# Patient Record
Sex: FEMALE | Race: WHITE | NOT HISPANIC OR LATINO | Employment: FULL TIME | URBAN - METROPOLITAN AREA
[De-identification: names, ages, dates, MRNs, and addresses within clinical notes are randomized per-mention and may not be internally consistent; named-entity substitution may affect disease eponyms.]

---

## 2022-08-22 ENCOUNTER — VIRTUAL VISIT (OUTPATIENT)
Dept: DERMATOLOGY | Facility: CLINIC | Age: 22
End: 2022-08-22
Payer: COMMERCIAL

## 2022-08-22 DIAGNOSIS — L70.0 ACNE VULGARIS: Primary | ICD-10-CM

## 2022-08-22 DIAGNOSIS — Z79.899 ON ISOTRETINOIN THERAPY: ICD-10-CM

## 2022-08-22 PROCEDURE — 99203 OFFICE O/P NEW LOW 30 MIN: CPT | Mod: 95 | Performed by: DERMATOLOGY

## 2022-08-22 NOTE — NURSING NOTE
Chief Complaint   Patient presents with     Derm Problem   Teledermatology Nurse Call Patients:     Are you in the [facility] at the time of the encounter? yes    Today's visit will be billed to you and your insurance.    A teledermatology visit is not as thorough as an in-person visit and the quality of the photograph sent may not be of the same quality as that taken by the dermatology clinic.    Patient summary of issue: acne  Location of problem on body: Back and face  How long has area/symptoms been present: many years  What makes it better?: nothing has really helped in the past   What makes it worse?: sweat   Other symptoms include the following: painful  Which medications have been tried, for how long, and did they make it better or worse (ex. Triamcinolone used twice daily for 2 weeks, not improved): multiple medications and creams  The patient has seen a dermatologist.   The patient has no past medical history of skin cancer  ROS: The patient is generally feeling well.    Dagmar Owen,   Virtual Visit Facilitator

## 2022-08-22 NOTE — LETTER
Date:August 24, 2022      Patient was self referred, no letter generated. Do not send.        [facility] Health Information

## 2022-08-22 NOTE — PROGRESS NOTES
[facility] Dermatology Note  Encounter Date: Aug 22, 2022  Store-and-Forward and Telephone ([phone]). Location of teledermatologist: [facility] DERMATOLOGY CLINIC [place].  Start time: 9:53. End time: 10:10.    Dermatology Problem List:  1. Acne vulgaris   - goal dose: 70 kg x 220 mg/kg = 75076 mg  - cumulative dose: 0 mg  - contraception: Depo; considering switch to copper IUD; condoms  - prior: tretinoin, topical antibiotics, spironolactone    ____________________________________________    Assessment & Plan:     1. Acne vulgaris: mixed inflammatory/comedonal and refractory to topicals and spironolactone. We discussed risks/benefits of next steps in treatment including spironolactone, oral antibiotics, isotretinoin, and will pursue the last. We discussed that changing contraceptive method (from Depo to ParaGard) may cause delay in iPledge.  - come in for nurse visit for iPledge enrollment & baseline CBC, CMP, lipids, hCG  - repeat UPT in 30 days  - then start isotretinoin 40 mg daily    Procedures Performed:    None    Follow-up: 1 month    Staff:     René Desir MD, FAAD   of Dermatology  Department of Dermatology  [facility] [employer]    ____________________________________________    CC: Derm Problem    HPI:  Ms. Leelee Rousseau is a(n) 22 year old female who presents today as a new patient for acne vulgaris    Acne vulgaris - on face, seems to correspond with menstruation - fluctuates in severity  - back has been more severe since age 15-16; flares with sweating  - prior - tretinoin, other topicals, spironolactone ~25-50 mg (3 months on/off) a few years ago  - deep, large, painful lesions on the back    Patient is otherwise feeling well, without additional skin concerns.    Labs Reviewed:  N/A    Physical Exam:  Vitals: There were no vitals taken for this visit.  SKIN: Teledermatology photos were reviewed; image quality and  interpretability: acceptable. Image date: 8/22/22.  - acneiform papules on the face and back  - No other lesions of concern on areas examined.     Medications:  Current Outpatient Medications   Medication     medroxyPROGESTERone (DEPO-SUBQ PROVERA) 104 MG/0.65ML SC injection     No current facility-administered medications for this visit.      Past Medical/Surgical History:   There is no problem list on file for this patient.    No past medical history on file.    CC Referred Self, MD  No address on file on close of this encounter.

## 2022-08-22 NOTE — LETTER
8/22/2022       RE: Leelee Rousseau  [address] [facility]   [address]  [place] [address]     Dear Colleague,    Thank you for referring your patient, Leelee Rousseau, to the [facility] DERMATOLOGY CLINIC [place] at [facility]. Please see a copy of my visit note below.    [facility] Dermatology Note  Encounter Date: Aug 22, 2022  Store-and-Forward and Telephone ([phone]). Location of teledermatologist: [facility] DERMATOLOGY [facility].  Start time: 9:53. End time: 10:10.    Dermatology Problem List:  1. Acne vulgaris   - goal dose: 70 kg x 220 mg/kg = 93524 mg  - cumulative dose: 0 mg  - contraception: Depo; considering switch to copper IUD; condoms  - prior: tretinoin, topical antibiotics, spironolactone    ____________________________________________    Assessment & Plan:     1. Acne vulgaris: mixed inflammatory/comedonal and refractory to topicals and spironolactone. We discussed risks/benefits of next steps in treatment including spironolactone, oral antibiotics, isotretinoin, and will pursue the last. We discussed that changing contraceptive method (from Depo to ParaGard) may cause delay in iPledge.  - come in for nurse visit for iPledge enrollment & baseline CBC, CMP, lipids, hCG  - repeat UPT in 30 days  - then start isotretinoin 40 mg daily    Procedures Performed:    None    Follow-up: 1 month    Staff:     René Desir MD, FAAD   of Dermatology  Department of Dermatology  [facility] [employer]    ____________________________________________    CC: Derm Problem    HPI:  Ms. Leelee Rousseau is a(n) 22 year old female who presents today as a new patient for acne vulgaris    Acne vulgaris - on face, seems to correspond with menstruation - fluctuates in severity  - back has been more severe since age 15-16; flares with sweating  - prior - tretinoin, other topicals,  spironolactone ~25-50 mg (3 months on/off) a few years ago  - deep, large, painful lesions on the back    Patient is otherwise feeling well, without additional skin concerns.    Labs Reviewed:  N/A    Physical Exam:  Vitals: There were no vitals taken for this visit.  SKIN: Teledermatology photos were reviewed; image quality and interpretability: acceptable. Image date: 8/22/22.  - acneiform papules on the face and back  - No other lesions of concern on areas examined.     Medications:  Current Outpatient Medications   Medication     medroxyPROGESTERone (DEPO-SUBQ PROVERA) 104 MG/0.65ML SC injection     No current facility-administered medications for this visit.      Past Medical/Surgical History:   There is no problem list on file for this patient.    No past medical history on file.    CALDERON Scott MD  No address on file on close of this encounter.      Again, thank you for allowing me to participate in the care of your patient.      Sincerely,    René Desir MD

## 2022-08-24 ENCOUNTER — TELEPHONE (OUTPATIENT)
Dept: DERMATOLOGY | Facility: CLINIC | Age: 22
End: 2022-08-24

## 2022-08-24 NOTE — TELEPHONE ENCOUNTER
Attempted to reach patient to schedule follow up in the Dermatology Clinic.  No answer,  LM on VM to call office .    Schedule with Dr. René Desir on or around 10/24/22 for Accutane.

## 2022-08-26 ENCOUNTER — ALLIED HEALTH/NURSE VISIT (OUTPATIENT)
Dept: DERMATOLOGY | Facility: CLINIC | Age: 22
End: 2022-08-26
Payer: COMMERCIAL

## 2022-08-26 ENCOUNTER — LAB (OUTPATIENT)
Dept: LAB | Facility: CLINIC | Age: 22
End: 2022-08-26
Payer: COMMERCIAL

## 2022-08-26 DIAGNOSIS — L70.0 ACNE VULGARIS: Primary | ICD-10-CM

## 2022-08-26 DIAGNOSIS — Z79.899 ON ISOTRETINOIN THERAPY: ICD-10-CM

## 2022-08-26 LAB
ALBUMIN SERPL-MCNC: 3.9 G/DL (ref 3.4–5)
ALP SERPL-CCNC: 61 U/L (ref 40–150)
ALT SERPL W P-5'-P-CCNC: 19 U/L (ref 0–50)
ANION GAP SERPL CALCULATED.3IONS-SCNC: 6 MMOL/L (ref 3–14)
AST SERPL W P-5'-P-CCNC: 12 U/L (ref 0–45)
BASOPHILS # BLD AUTO: 0.1 10E3/UL (ref 0–0.2)
BASOPHILS NFR BLD AUTO: 1 %
BILIRUB SERPL-MCNC: 0.6 MG/DL (ref 0.2–1.3)
BUN SERPL-MCNC: 10 MG/DL (ref 7–30)
CALCIUM SERPL-MCNC: 9.3 MG/DL (ref 8.5–10.1)
CHLORIDE BLD-SCNC: 108 MMOL/L (ref 94–109)
CHOLEST SERPL-MCNC: 185 MG/DL
CO2 SERPL-SCNC: 27 MMOL/L (ref 20–32)
CREAT SERPL-MCNC: 0.78 MG/DL (ref 0.52–1.04)
EOSINOPHIL # BLD AUTO: 0.2 10E3/UL (ref 0–0.7)
EOSINOPHIL NFR BLD AUTO: 3 %
ERYTHROCYTE [DISTWIDTH] IN BLOOD BY AUTOMATED COUNT: 12.5 % (ref 10–15)
FASTING STATUS PATIENT QL REPORTED: NO
GFR SERPL CREATININE-BSD FRML MDRD: >90 ML/MIN/1.73M2
GLUCOSE BLD-MCNC: 79 MG/DL (ref 70–99)
HCG UR QL: NEGATIVE
HCT VFR BLD AUTO: 42.8 % (ref 35–47)
HDLC SERPL-MCNC: 62 MG/DL
HGB BLD-MCNC: 14.3 G/DL (ref 11.7–15.7)
IMM GRANULOCYTES # BLD: 0 10E3/UL
IMM GRANULOCYTES NFR BLD: 0 %
LDLC SERPL CALC-MCNC: 94 MG/DL
LYMPHOCYTES # BLD AUTO: 2.8 10E3/UL (ref 0.8–5.3)
LYMPHOCYTES NFR BLD AUTO: 32 %
MCH RBC QN AUTO: 29.2 PG (ref 26.5–33)
MCHC RBC AUTO-ENTMCNC: 33.4 G/DL (ref 31.5–36.5)
MCV RBC AUTO: 88 FL (ref 78–100)
MONOCYTES # BLD AUTO: 0.6 10E3/UL (ref 0–1.3)
MONOCYTES NFR BLD AUTO: 7 %
NEUTROPHILS # BLD AUTO: 4.9 10E3/UL (ref 1.6–8.3)
NEUTROPHILS NFR BLD AUTO: 57 %
NONHDLC SERPL-MCNC: 123 MG/DL
NRBC # BLD AUTO: 0 10E3/UL
NRBC BLD AUTO-RTO: 0 /100
PLATELET # BLD AUTO: 295 10E3/UL (ref 150–450)
POTASSIUM BLD-SCNC: 4 MMOL/L (ref 3.4–5.3)
PROT SERPL-MCNC: 7.5 G/DL (ref 6.8–8.8)
RBC # BLD AUTO: 4.89 10E6/UL (ref 3.8–5.2)
SODIUM SERPL-SCNC: 141 MMOL/L (ref 133–144)
TRIGL SERPL-MCNC: 144 MG/DL
WBC # BLD AUTO: 8.5 10E3/UL (ref 4–11)

## 2022-08-26 PROCEDURE — 80053 COMPREHEN METABOLIC PANEL: CPT | Performed by: PATHOLOGY

## 2022-08-26 PROCEDURE — 85025 COMPLETE CBC W/AUTO DIFF WBC: CPT | Performed by: PATHOLOGY

## 2022-08-26 PROCEDURE — 81025 URINE PREGNANCY TEST: CPT | Performed by: PATHOLOGY

## 2022-08-26 PROCEDURE — 80061 LIPID PANEL: CPT | Performed by: PATHOLOGY

## 2022-08-26 PROCEDURE — 99207 PR NO BILLABLE SERVICE THIS VISIT: CPT

## 2022-08-26 PROCEDURE — 36415 COLL VENOUS BLD VENIPUNCTURE: CPT | Performed by: PATHOLOGY

## 2022-08-26 NOTE — PROGRESS NOTES
Leelee SANTHOSH Soham comes into clinic today at the request of Dr. Desir Ordering Provider for iPledge sign up.        This service provided today was under the supervising provider of the day Dr. Reveles, who was available if needed.    Pramod Allison, [facility]

## 2022-09-26 ENCOUNTER — MYC MEDICAL ADVICE (OUTPATIENT)
Dept: DERMATOLOGY | Facility: CLINIC | Age: 22
End: 2022-09-26

## 2022-09-27 NOTE — TELEPHONE ENCOUNTER
Confirm Patient Counseling is Complete.  The patient's REMS Status is Required to Demonstrate Comprehension.    The patient must answer their Comprehension questions, and then may have their prescription filled before 11:59 PM Eastern Time on 10/3/2022.

## 2022-10-01 ENCOUNTER — HEALTH MAINTENANCE LETTER (OUTPATIENT)
Age: 22
End: 2022-10-01

## 2022-10-03 NOTE — TELEPHONE ENCOUNTER
Patient REMS Status  Status: Enrolled  Action: No prescriber action required at this time  Information: The patient s 7-day prescription window , and it was the patient s first window of therapy. Since patient s first prescription must be preceded by two negative pregnancy tests at least 19 days apart, the patient must wait at least 12 days beyond the end of patient s missed 7-day prescription window before the next pregnancy test. This test must be in the first 5 days of the patient s menstrual cycle.

## 2022-10-03 NOTE — TELEPHONE ENCOUNTER
If she needs to submit another UPT in 12 days, I'll need to wait until it's submitted to represcribe (the window only lasts 7 days). Thanks!

## 2022-10-21 ENCOUNTER — TELEPHONE (OUTPATIENT)
Dept: DERMATOLOGY | Facility: CLINIC | Age: 22
End: 2022-10-21

## 2022-10-21 NOTE — TELEPHONE ENCOUNTER
Health Call Center    Phone Message    May a detailed message be left on voicemail: yes     Reason for Call: Other: Patient states she took her first dose of Accutane today and that Dr. Desir wanted to speak with her after she has taken it for a month. Patient has an appt scheduled with Dr. Desir on 10/24/2022 and wants to know if that can be moved out 1 month. Writer unable to schedule before 03/2023.  Please call back to discuss.  Thank you    Action Taken: Message routed to:  [facility] Derm    Travel Screening: Not Applicable

## 2022-11-14 ENCOUNTER — TELEPHONE (OUTPATIENT)
Dept: DERMATOLOGY | Facility: CLINIC | Age: 22
End: 2022-11-14

## 2022-11-14 NOTE — TELEPHONE ENCOUNTER
[facility] Call Center    Phone Message    May a detailed message be left on voicemail: yes     Reason for Call: Other: Pt called and would like to schedule her one month f/u now. Pt has about 5 days worth of meds left. Pt stated that she hasn't been on this medication for a full month yet so that's why she hasn't scheduled an appt.  Please call her back to schedule. Next opening is March. Thanks       Action Taken: Message routed to:  [facility] DERM    Travel Screening: Not Applicable

## 2022-11-16 NOTE — TELEPHONE ENCOUNTER
LVM asking the patient to call the clinic back to schedule multiple monthly appointments. Patient no showed her last appointment    Pramod Allison CMA

## 2022-11-18 ENCOUNTER — VIRTUAL VISIT (OUTPATIENT)
Dept: DERMATOLOGY | Facility: CLINIC | Age: 22
End: 2022-11-18
Payer: COMMERCIAL

## 2022-11-18 DIAGNOSIS — Z79.899 ON ISOTRETINOIN THERAPY: ICD-10-CM

## 2022-11-18 DIAGNOSIS — L70.0 ACNE VULGARIS: Primary | ICD-10-CM

## 2022-11-18 PROCEDURE — 99214 OFFICE O/P EST MOD 30 MIN: CPT | Mod: 95 | Performed by: DERMATOLOGY

## 2022-11-18 RX ORDER — ISOTRETINOIN 40 MG/1
80 CAPSULE ORAL DAILY
Qty: 60 CAPSULE | Refills: 0 | Status: SHIPPED | OUTPATIENT
Start: 2022-11-18 | End: 2022-12-16

## 2022-11-18 NOTE — NURSING NOTE
Chief Complaint   Patient presents with     telephone visit      Accutane - no noticing much change, skin is drier. Has questions about products she could be using    Teledermatology Nurse Call Patients:     Are you in the [facility] at the time of the encounter? yes    Today's visit will be billed to you and your insurance.    A teledermatology visit is not as thorough as an in-person visit and the quality of the photograph sent may not be of the same quality as that taken by the dermatology clinic.    Dagmar Owen,   Virtual Visit Facilitator

## 2022-11-18 NOTE — LETTER
11/18/2022       RE: Leelee Rousseau  [address] [facility]   [address]  [place] [address]     Dear Colleague,    Thank you for referring your patient, Leelee Rousseau, to the [facility] DERMATOLOGY CLINIC [place] at [facility]. Please see a copy of my visit note below.    [facility] Dermatology Note  Encounter Date: Nov 18, 2022  Store-and-Forward and Telephone ([phone]). Location of teledermatologist: [facility] DERMATOLOGY CLINIC [place].  Start time: 11:51. End time: 11:59.    Dermatology Problem List:  1. Acne vulgaris   - goal dose: 70 kg x 220 mg/kg = 32187 mg  - cumulative dose: 1200 mg  - contraception: Depo; considering switch to copper IUD; condoms  - prior: tretinoin, topical antibiotics, spironolactone       ____________________________________________    Assessment & Plan:     1. Acne vulgaris: on isotretinoin 40 mg daily and tolerating well. Anticipated dryness but this is tolerable. Acne hasn't started to improve yet. We discussed dose increase to 60 mg vs 80 mg; will pursue the latter and monitor symptoms.  - increase isotretinoin to 80 mg daily  - check labs - CBC, CMP, lipids, hCG in 1 month    Procedures Performed:    None    Follow-up: 1 month    Staff:     René Desir MD, FAAD   of Dermatology  Department of Dermatology  [facility]    ____________________________________________    CC: telephone visit  (Accutane - no noticing much change, skin is drier. Has questions about products she could be using )    HPI:  Ms. Leelee Rousseau is a(n) 22 year old female who presents today as a return patient for acne vulgaris    Acne vulgaris - started isotretinoin 40 mg daily  - acne not improved yet  - dryness around mouth - more dry skin in general  - using Aquaphor  - no MSK symptoms, no headaches, no mood changes    Patient is otherwise feeling well,  without additional skin concerns.    Labs Reviewed:  11/18/22 UPT negative    Physical Exam:  Vitals: There were no vitals taken for this visit.  SKIN: Teledermatology photos were reviewed; image quality and interpretability: acceptable. Image date: 11/18/22.  - UPT negative  - No other lesions of concern on areas examined.     Medications:  Current Outpatient Medications   Medication     ISOtretinoin (ACCUTANE) 40 MG capsule     medroxyPROGESTERone (DEPO-SUBQ PROVERA) 104 MG/0.65ML SC injection     No current facility-administered medications for this visit.      Past Medical/Surgical History:   There is no problem list on file for this patient.    No past medical history on file.    CC Xavier Scott MD  No address on file on close of this encounter.      Again, thank you for allowing me to participate in the care of your patient.      Sincerely,    René Desir MD

## 2022-11-18 NOTE — LETTER
Date:November 18, 2022      Patient was self referred, no letter generated. Do not send.        [facility] Health Information

## 2022-11-18 NOTE — PROGRESS NOTES
[facility] Dermatology Note  Encounter Date: Nov 18, 2022  Store-and-Forward and Telephone ([phone]). Location of teledermatologist: [facility] DERMATOLOGY CLINIC [place].  Start time: 11:51. End time: 11:59.    Dermatology Problem List:  1. Acne vulgaris   - goal dose: 70 kg x 220 mg/kg = 76817 mg  - cumulative dose: 1200 mg  - contraception: Depo; considering switch to copper IUD; condoms  - prior: tretinoin, topical antibiotics, spironolactone       ____________________________________________    Assessment & Plan:     1. Acne vulgaris: on isotretinoin 40 mg daily and tolerating well. Anticipated dryness but this is tolerable. Acne hasn't started to improve yet. We discussed dose increase to 60 mg vs 80 mg; will pursue the latter and monitor symptoms.  - increase isotretinoin to 80 mg daily  - check labs - CBC, CMP, lipids, hCG in 1 month    Procedures Performed:    None    Follow-up: 1 month    Staff:     René Desir MD, FAAD   of Dermatology  Department of Dermatology  [facility] [employer] [facility]    ____________________________________________    CC: telephone visit  (Accutane - no noticing much change, skin is drier. Has questions about products she could be using )    HPI:  Ms. Leelee Rousseau is a(n) 22 year old female who presents today as a return patient for acne vulgaris    Acne vulgaris - started isotretinoin 40 mg daily  - acne not improved yet  - dryness around mouth - more dry skin in general  - using Aquaphor  - no MSK symptoms, no headaches, no mood changes    Patient is otherwise feeling well, without additional skin concerns.    Labs Reviewed:  11/18/22 UPT negative    Physical Exam:  Vitals: There were no vitals taken for this visit.  SKIN: Teledermatology photos were reviewed; image quality and interpretability: acceptable. Image date: 11/18/22.  - UPT negative  - No other lesions of concern on areas examined.      Medications:  Current Outpatient Medications   Medication     ISOtretinoin (ACCUTANE) 40 MG capsule     medroxyPROGESTERone (DEPO-SUBQ PROVERA) 104 MG/0.65ML SC injection     No current facility-administered medications for this visit.      Past Medical/Surgical History:   There is no problem list on file for this patient.    No past medical history on file.    CC Referred Self, MD  No address on file on close of this encounter.

## 2022-12-14 ENCOUNTER — LAB (OUTPATIENT)
Dept: LAB | Facility: CLINIC | Age: 22
End: 2022-12-14
Payer: COMMERCIAL

## 2022-12-14 DIAGNOSIS — Z79.899 ON ISOTRETINOIN THERAPY: ICD-10-CM

## 2022-12-14 LAB
ALBUMIN SERPL BCG-MCNC: 4.4 G/DL (ref 3.5–5.2)
ALP SERPL-CCNC: 56 U/L (ref 35–104)
ALT SERPL W P-5'-P-CCNC: 14 U/L (ref 10–35)
ANION GAP SERPL CALCULATED.3IONS-SCNC: 12 MMOL/L (ref 7–15)
AST SERPL W P-5'-P-CCNC: 21 U/L (ref 10–35)
BASOPHILS # BLD AUTO: 0 10E3/UL (ref 0–0.2)
BASOPHILS NFR BLD AUTO: 1 %
BILIRUB SERPL-MCNC: 0.3 MG/DL
BUN SERPL-MCNC: 9.4 MG/DL (ref 6–20)
CALCIUM SERPL-MCNC: 9.6 MG/DL (ref 8.6–10)
CHLORIDE SERPL-SCNC: 105 MMOL/L (ref 98–107)
CHOLEST SERPL-MCNC: 192 MG/DL
CREAT SERPL-MCNC: 0.77 MG/DL (ref 0.51–0.95)
DEPRECATED HCO3 PLAS-SCNC: 25 MMOL/L (ref 22–29)
EOSINOPHIL # BLD AUTO: 0.1 10E3/UL (ref 0–0.7)
EOSINOPHIL NFR BLD AUTO: 2 %
ERYTHROCYTE [DISTWIDTH] IN BLOOD BY AUTOMATED COUNT: 12.6 % (ref 10–15)
GFR SERPL CREATININE-BSD FRML MDRD: >90 ML/MIN/1.73M2
GLUCOSE SERPL-MCNC: 88 MG/DL (ref 70–99)
HCG UR QL: NEGATIVE
HCT VFR BLD AUTO: 37.9 % (ref 35–47)
HDLC SERPL-MCNC: 46 MG/DL
HGB BLD-MCNC: 12.8 G/DL (ref 11.7–15.7)
IMM GRANULOCYTES # BLD: 0 10E3/UL
IMM GRANULOCYTES NFR BLD: 0 %
LDLC SERPL CALC-MCNC: 116 MG/DL
LYMPHOCYTES # BLD AUTO: 2.8 10E3/UL (ref 0.8–5.3)
LYMPHOCYTES NFR BLD AUTO: 32 %
MCH RBC QN AUTO: 29.6 PG (ref 26.5–33)
MCHC RBC AUTO-ENTMCNC: 33.8 G/DL (ref 31.5–36.5)
MCV RBC AUTO: 88 FL (ref 78–100)
MONOCYTES # BLD AUTO: 0.5 10E3/UL (ref 0–1.3)
MONOCYTES NFR BLD AUTO: 5 %
NEUTROPHILS # BLD AUTO: 5.4 10E3/UL (ref 1.6–8.3)
NEUTROPHILS NFR BLD AUTO: 60 %
NONHDLC SERPL-MCNC: 146 MG/DL
NRBC # BLD AUTO: 0 10E3/UL
NRBC BLD AUTO-RTO: 0 /100
PLATELET # BLD AUTO: 284 10E3/UL (ref 150–450)
POTASSIUM SERPL-SCNC: 3.4 MMOL/L (ref 3.4–5.3)
PROT SERPL-MCNC: 7.2 G/DL (ref 6.4–8.3)
RBC # BLD AUTO: 4.33 10E6/UL (ref 3.8–5.2)
SODIUM SERPL-SCNC: 142 MMOL/L (ref 136–145)
TRIGL SERPL-MCNC: 149 MG/DL
WBC # BLD AUTO: 8.8 10E3/UL (ref 4–11)

## 2022-12-14 PROCEDURE — 81025 URINE PREGNANCY TEST: CPT | Performed by: PATHOLOGY

## 2022-12-14 PROCEDURE — 36415 COLL VENOUS BLD VENIPUNCTURE: CPT | Performed by: PATHOLOGY

## 2022-12-14 PROCEDURE — 80053 COMPREHEN METABOLIC PANEL: CPT | Performed by: PATHOLOGY

## 2022-12-14 PROCEDURE — 80061 LIPID PANEL: CPT | Performed by: PATHOLOGY

## 2022-12-14 PROCEDURE — 85025 COMPLETE CBC W/AUTO DIFF WBC: CPT | Performed by: PATHOLOGY

## 2022-12-16 ENCOUNTER — VIRTUAL VISIT (OUTPATIENT)
Dept: DERMATOLOGY | Facility: CLINIC | Age: 22
End: 2022-12-16
Payer: COMMERCIAL

## 2022-12-16 DIAGNOSIS — L70.0 ACNE VULGARIS: Primary | ICD-10-CM

## 2022-12-16 DIAGNOSIS — Z79.899 ON ISOTRETINOIN THERAPY: ICD-10-CM

## 2022-12-16 PROCEDURE — 99214 OFFICE O/P EST MOD 30 MIN: CPT | Mod: GC | Performed by: DERMATOLOGY

## 2022-12-16 RX ORDER — ISOTRETINOIN 40 MG/1
80 CAPSULE ORAL DAILY
Qty: 60 CAPSULE | Refills: 0 | Status: SHIPPED | OUTPATIENT
Start: 2022-12-16 | End: 2023-01-16

## 2022-12-16 NOTE — PROGRESS NOTES
[facility] Dermatology Note  Encounter Date: Dec 16, 2022  Store-and-Forward and Telephone ([phone]). Location of teledermatologist: [facility] DERMATOLOGY CLINIC [place].  Start time: 715. End time: 725.    Dermatology Problem List:  1. Acne vulgaris, on accutane   - goal dose: 70 kg x 220 mg/kg = 39122 mg  - cumulative dose: 3600 mg   - contraception: Depo; considering switch to copper IUD; condoms  - prior: tretinoin, topical antibiotics, spironolactone    ____________________________________________    Assessment & Plan:     1. Acne Vulgaris   Doing well on isotretinoin. Tolerating well. Still a bit active but trend toward improvement. Reviewed risks and side effects including but not limited to mucocutaneous dryness, arthralgias, myalgias, depression, suicidal ideation, headache, blurred vision, GI upset, increase in liver enzymes, increase in lipids, and teratogenic effects was reviewed. For female patients, discussed need for two forms of contraception. Patient counseled they cannot give blood while on isotretinoin. iPLEDGE program consent is on file.   - continue isotretinoin 80 mg daily  - CBC, CMP, lipids, negative pregnancy test    Procedures Performed:    None    Follow-up: 4 week(s) in-person, or earlier for new or changing lesions    Staff and Resident:     Mali Abarca MD     The patient was seen and staffed with Dr. Karlee MD     Staff Physician Comments:   I evaluated any available patient photographs with the resident and I edited the assessment and plan as documented in the note. I was present on the line and participated in the entire telephone call.    René Desir MD   of Dermatology  Department of Dermatology  [facility] [employer]      ____________________________________________    CC: Derm Problem (Accutane )    HPI:  Ms. Leelee Rousseau is a(n) 22 year old female who presents today as a return  patient for accutane. Finished with month #2. Getting a few pimples on the face and still some painful ones on the back, but face overall is improved. The patient reports tolerable mucocutaneous dryness, and denies arthralgias, myalgias, depression, suicidal ideation, diarrhea, headache, or blurred vision.      Patient is otherwise feeling well, without additional skin concerns.    Labs Reviewed:  12/14/22 CBC, CMP, lipids OK, hCG negative    Physical Exam:  Vitals: There were no vitals taken for this visit.  SKIN: Teledermatology photos were reviewed; image quality and interpretability: acceptable. Image date: 12/16/22.  - inflammatory papules on the face   - No other lesions of concern on areas examined.     Medications:  Current Outpatient Medications   Medication     ISOtretinoin (ACCUTANE) 40 MG capsule     medroxyPROGESTERone (DEPO-SUBQ PROVERA) 104 MG/0.65ML SC injection     No current facility-administered medications for this visit.      Past Medical/Surgical History:   There is no problem list on file for this patient.    No past medical history on file.    CC Referred Self, MD  No address on file on close of this encounter.

## 2022-12-16 NOTE — PATIENT INSTRUCTIONS
Instructions:   - take isotretinoin 80 mg  daily with fatty foods (nut butters, avocado) to increase abosorption  - do not use any other prescription acne products (oral or topical), gentle skin care only   - sunscreen to the face every day (SPF 30 or higher)  - do not donate blood or share this medication with anyone else   - do not take antibiotics from the tetracycline family while on isotretinoin (ex: tetracycline, doxycycline, minocycline)     Isotretinoin Helpful Tips:  Accutane is the best known name brand for isotretinoin. It is actually no longer made under this Accutane name brand. Other name brands are available (i.e. Amnesteem, Sotret). Generic isotretinoin is available and is usually what will be prescribed for you. It is a very safe and effective medication, but there are side effects that you should be aware of.     - What you will notice on an everyday basis: Dry eyes, lips, and skin.    - Help for dry lips: Vaseline, Aquaphor healing ointment, Vaniply, Hydrocortisone ointment  - Help for dry skin, all the following brands have good moisturizers that won't clog pores: Vanicream, Cervave, La - Roche Posay  - Help for dry eyes: lubricating eye drops (Systane Ultra Lubricant Eye Drops), use preservative-free eye drops if using more than 3x/day. Let your doctor know if you have any vision changes!     Other possible side effects include:   - Redder face and increased sensitivity to the sun. If you are taking a trip and be in the sun a lot, then consider stopping your isotretinoin 2 days prior to leaving.  (ask your doctor).   - Muscle and joint pain. If you are going to be doing heavy physical activity or a contact sport, you are at higher risk for muscle breakdown. Please discuss this with your provider (i.e. football and hockey players). General exercise (shoveling snow, long runs, weightlifting) should be fine, but if you are more sore than usual after, you may need to adjust your dose.     Uncommon  but important side effects to be aware of:  - Lab abnormalities (this is why we check labs while you are taking this medication).  - Severe birth defects if you become pregnant while on this medication.  - Possible depressed mood/suicidal thoughts. Please be sure to bring up any changes in mood with your doctor.   - Possible association with inflammatory disease. Please let your doctor know if you are having bloody bowel movements.   - Possible severe headache with vision changes. This head would feel different from your usual headaches and would have vision changes at the same time. If this happens, stop taking isotretinoin and immediately go the nearest ED for evaluation.     iPLEDGE program tips for women:  -  Women need to answer questions on-line every month, mainly about but not limited to the fact that this medicine causes birth defects and about the importance of not becoming pregnant while on isotretinoin.    - Women must be on 2 forms of birth control!  What you put down in the ipledge program MUST match what the doctor puts down!  So if you change your mind on your two forms of birth control, we need to know about it!  - Your prescription must be picked up from the pharmacy within 7 days from the date it was written and your pregnancy test was done or you will need to have another pregnancy test done.     If you forget your number or password, please call the iPLEDGE program: [phone]

## 2022-12-16 NOTE — LETTER
12/16/2022       RE: Leelee Rousseau  [address] [facility]   [address]  [facility] [address]     Dear Colleague,    Thank you for referring your patient, Leelee Rousseau, to the [facility] DERMATOLOGY CLINIC [place] at [facility]. Please see a copy of my visit note below.    [facility] Dermatology Note  Encounter Date: Dec 16, 2022  [employer] and Telephone ([phone]). Location of teledermatologist: [facility]ERMATOLOG[facility] [place].  Start time: 715. End time: 725.    Dermatology Problem List:  1. Acne vulgaris, on accutane   - goal dose: 70 kg x 220 mg/kg = 72683 mg  - cumulative dose: 3600 mg   - contraception: Depo; considering switch to copper IUD; condoms  - prior: tretinoin, topical antibiotics, spironolactone    ____________________________________________    Assessment & Plan:     1. Acne Vulgaris   Doing well on isotretinoin. Tolerating well. Still a bit active but trend toward improvement. Reviewed risks and side effects including but not limited to mucocutaneous dryness, arthralgias, myalgias, depression, suicidal ideation, headache, blurred vision, GI upset, increase in liver enzymes, increase in lipids, and teratogenic effects was reviewed. For female patients, discussed need for two forms of contraception. Patient counseled they cannot give blood while on isotretinoin. iPLEDGE program consent is on file.   - continue isotretinoin 80 mg daily  - CBC, CMP, lipids, negative pregnancy test    Procedures Performed:    None    Follow-up: 4 week(s) in-person, or earlier for new or changing lesions    Staff and Resident:     Mali Abarca MD     The patient was seen and staffed with Dr. Karlee MD     Staff Physician Comments:   I evaluated any available patient photographs with the resident and I edited the assessment and plan as documented in the note. I was present on the line and participated in the  entire telephone call.    René Desir MD   of Dermatology  Department of Dermatology  [facility] [employer]      ____________________________________________    CC: Derm Problem (Accutane )    HPI:  Ms. Leelee Rousseau is a(n) 22 year old female who presents today as a return patient for accutane. Finished with month #2. Getting a few pimples on the face and still some painful ones on the back, but face overall is improved. The patient reports tolerable mucocutaneous dryness, and denies arthralgias, myalgias, depression, suicidal ideation, diarrhea, headache, or blurred vision.      Patient is otherwise feeling well, without additional skin concerns.    Labs Reviewed:  12/14/22 CBC, CMP, lipids OK, hCG negative    Physical Exam:  Vitals: There were no vitals taken for this visit.  SKIN: Teledermatology photos were reviewed; image quality and interpretability: acceptable. Image date: 12/16/22.  - inflammatory papules on the face   - No other lesions of concern on areas examined.     Medications:  Current Outpatient Medications   Medication     ISOtretinoin (ACCUTANE) 40 MG capsule     medroxyPROGESTERone (DEPO-SUBQ PROVERA) 104 MG/0.65ML SC injection     No current facility-administered medications for this visit.      Past Medical/Surgical History:   There is no problem list on file for this patient.    No past medical history on file.    CC Referred Self, MD  No address on file on close of this encounter.      Again, thank you for allowing me to participate in the care of your patient.      Sincerely,    René Desir MD

## 2022-12-16 NOTE — NURSING NOTE
Chief Complaint   Patient presents with     Derm Problem     Accutane    Teledermatology Nurse Call Patients:     Are you in the [facility] at the time of the encounter? yes    Today's visit will be billed to you and your insurance.    A teledermatology visit is not as thorough as an in-person visit and the quality of the photograph sent may not be of the same quality as that taken by the dermatology clinic.    Patient states she will be sending photos prior to appointment. She only has them of her face and not of her back today.     Dagmar Owen,   Virtual Visit Facilitator

## 2022-12-16 NOTE — LETTER
Date:December 16, 2022      Patient was self referred, no letter generated. Do not send.        [facility] Health Information

## 2022-12-28 ENCOUNTER — ANCILLARY PROCEDURE (OUTPATIENT)
Dept: GENERAL RADIOLOGY | Facility: CLINIC | Age: 22
End: 2022-12-28
Attending: STUDENT IN AN ORGANIZED HEALTH CARE EDUCATION/TRAINING PROGRAM
Payer: COMMERCIAL

## 2022-12-28 ENCOUNTER — OFFICE VISIT (OUTPATIENT)
Dept: FAMILY MEDICINE | Facility: CLINIC | Age: 22
End: 2022-12-28
Payer: COMMERCIAL

## 2022-12-28 VITALS
DIASTOLIC BLOOD PRESSURE: 76 MMHG | TEMPERATURE: 98.1 F | OXYGEN SATURATION: 99 % | HEIGHT: 67 IN | HEART RATE: 102 BPM | WEIGHT: 149.8 LBS | SYSTOLIC BLOOD PRESSURE: 111 MMHG | BODY MASS INDEX: 23.51 KG/M2 | RESPIRATION RATE: 20 BRPM

## 2022-12-28 DIAGNOSIS — M25.552 HIP PAIN, LEFT: ICD-10-CM

## 2022-12-28 DIAGNOSIS — M54.9 BACK PAIN, UNSPECIFIED BACK LOCATION, UNSPECIFIED BACK PAIN LATERALITY, UNSPECIFIED CHRONICITY: Primary | ICD-10-CM

## 2022-12-28 PROCEDURE — 73502 X-RAY EXAM HIP UNI 2-3 VIEWS: CPT | Mod: TC | Performed by: RADIOLOGY

## 2022-12-28 PROCEDURE — 99213 OFFICE O/P EST LOW 20 MIN: CPT | Mod: GC

## 2022-12-28 RX ORDER — CYCLOBENZAPRINE HCL 5 MG
5 TABLET ORAL 3 TIMES DAILY PRN
Qty: 30 TABLET | Refills: 0 | Status: SHIPPED | OUTPATIENT
Start: 2022-12-28 | End: 2023-02-01

## 2022-12-28 NOTE — PROGRESS NOTES
Assessment & Plan     Back pain, unspecified back location, unspecified back pain laterality, unspecified chronicity  Hip pain, left  21yo female with history of scoliosis presenting for left low back/left hip pain x 2 weeks. She recalls wearing high heals on 12/2 for a wedding that may have brought on the pain. Personal history of scoliosis requiring a brace 5 years ago. Vital signs normal. Physical exam notable for Antalgic gait, favors right side, moderate tenderness to palpation of the left gluteal cleft. Suspect musculoskeletal spasm although cannot exclude left hip subluxation given antalgic gait and moderate pain. Will treat symptomatically with muscle relaxer and get imaging today. Follow up in 1 week.  -Continuing to ice and rest to help with symptoms   -work note provided   - XR Hip Left 2-3 Views- cyclobenzaprine (FLEXERIL) 5 MG tablet    Dispense: 30 tablet; Refill: 0     Return in about 1 week (around 1/4/2023) for Follow up.    Sandeep Brown DO, PGY-2  [facility]    Today I precepted with Dr. Rios MD, who agrees with the assessment and plan.      Darshana Mckoy is a 22 year old, presenting for the following health issues:  Back Pain.  Decline 3 booster, tdap and flu shot)      HPI     2 weeks, worsening the last 4 days.   Specific cause: None  Description:   Location of pain: low back right and starts in low back and shoots into buttocks   Character of pain: sharp  Pain radiation:radiates into the right buttocks  Intensity: 4/10, worse with moving around, twisting, walking  Accompanying Signs & Symptoms:  Fever: no   Numbness or weakness in legs:  no   Dysuria or Hematuria: no   Bowel or bladder incontinence: no   History:   Any injury (lifting, bending, twisting): no   Work Injury: no  History of back problems: no prior back problems  Any previous MRI or X-rays: no  Any history of back surgery: no   Any cancer history: no   Precipitating factors:   Worsened by:  "Walking and twisting .  Alleviating factors:  Improved by: icing, numbs the area, ibuprofen/tyelnol has not helped   Therapies Tried and outcome: rest and cold therapy    Family history back pain in mother   Personal history of scoliosis, [facility] brace [facility]. Orthopedic group in [place].     Review of Systems   Constitutional, HEENT, cardiovascular, pulmonary, gi and gu systems are negative, except as otherwise noted.      Objective    /76   Pulse 102   Temp 98.1  F (36.7  C) (Oral)   Resp 20   Ht 1.702 m (5' 7\")   Wt 67.9 kg (149 lb 12.8 oz)   SpO2 99%   BMI 23.46 kg/m    Body mass index is 23.46 kg/m .  Physical Exam   GENERAL: healthy, alert and no distress  NECK: no adenopathy, no asymmetry, masses, or scars and thyroid normal to palpation  RESP: lungs clear to auscultation - no rales, rhonchi or wheezes  CV: regular rate and rhythm, normal S1 S2, no S3 or S4, no murmur, click or rub, no peripheral edema and peripheral pulses strong  ABDOMEN: soft, nontender, no hepatosplenomegaly, no masses and bowel sounds normal  MS: no gross musculoskeletal defects noted, no edema  Antalgic gait, favors right side, moderate tenderness to palpation of the left gluteal cleft          "

## 2022-12-28 NOTE — PATIENT INSTRUCTIONS
Thank you for trusting us with your care.     Here's a summary of the visit today:   We will get imaging of your left hip today  Continuing to ice and rest to help with symptoms  Start taking flexiril for back pain   Work note provided   4. Follow up in 1 week       Thank you.     Dr. Brown

## 2022-12-28 NOTE — PROGRESS NOTES
Preceptor Attestation:  I discussed the patient with the resident and evaluated the patient in person. I have verified the content of the note, which accurately reflects my assessment of the patient and the plan of care.  Supervising Physician:  Chetna Nation MD.

## 2022-12-28 NOTE — LETTER
RETURN TO WORK/SCHOOL FORM    12/28/2022    Re: Leelee Rousseau  2000      To Whom It May Concern:     Leelee Rousseau was seen in clinic today..  She may return to work without restrictions on 01/02//22.            Sandeep Brown DO  12/28/2022 12:05 PM

## 2022-12-30 ENCOUNTER — OFFICE VISIT (OUTPATIENT)
Dept: FAMILY MEDICINE | Facility: CLINIC | Age: 22
End: 2022-12-30
Payer: COMMERCIAL

## 2022-12-30 VITALS
DIASTOLIC BLOOD PRESSURE: 88 MMHG | HEART RATE: 118 BPM | OXYGEN SATURATION: 99 % | RESPIRATION RATE: 16 BRPM | TEMPERATURE: 98.3 F | SYSTOLIC BLOOD PRESSURE: 122 MMHG

## 2022-12-30 DIAGNOSIS — L60.0 INGROWING TOENAIL WITH INFECTION: ICD-10-CM

## 2022-12-30 DIAGNOSIS — G57.02 PIRIFORMIS SYNDROME, LEFT: ICD-10-CM

## 2022-12-30 DIAGNOSIS — M25.552 HIP PAIN, LEFT: Primary | ICD-10-CM

## 2022-12-30 PROCEDURE — 98925 OSTEOPATH MANJ 1-2 REGIONS: CPT

## 2022-12-30 PROCEDURE — 99213 OFFICE O/P EST LOW 20 MIN: CPT | Mod: 25

## 2022-12-30 RX ORDER — SULFAMETHOXAZOLE/TRIMETHOPRIM 800-160 MG
1 TABLET ORAL 2 TIMES DAILY
Qty: 10 TABLET | Refills: 0 | Status: SHIPPED | OUTPATIENT
Start: 2022-12-30 | End: 2023-01-04

## 2022-12-30 RX ORDER — NAPROXEN 500 MG/1
500 TABLET ORAL 2 TIMES DAILY WITH MEALS
Qty: 60 TABLET | Refills: 0 | Status: SHIPPED | OUTPATIENT
Start: 2022-12-30 | End: 2023-02-01

## 2022-12-30 NOTE — LETTER
RETURN TO WORK/SCHOOL FORM    12/30/2022    Re: Leelee Rousseau  2000      To Whom It May Concern:     Leelee Rousseau was seen in clinic today.  She may return to work without restrictions on 01/02/23           Sandeep Brown DO  12/30/2022 11:39 AM

## 2022-12-30 NOTE — PATIENT INSTRUCTIONS
Thank you for trusting us with your care.     Here's a summary of the visit today:   Start taking naproxen twice daily with food   Follow up with [facility] as scheduled   Continue to rest as needed   4. Work note provided   5. Complete 5 day course of antibiotics for the toenail infection   6. Follow up in 1 week         Thank you.     Dr. Brown

## 2022-12-30 NOTE — PROGRESS NOTES
Assessment & Plan     Hip pain, left  Piriformis syndrome, left  Patient here for follow up on left hip pain. Pain 6/10 today. Discussed preliminary Xray left hip results without acute abnormalities. Tried indirect OMT treatment with counterstrain technique to the gluteal region with mild improvement in symptoms (pain 4/10). Running diagnosis Piriformis syndrome at this time. PT referral placed. Will try topical NSAID versus oral NSAID and switch to monotherapy depending on which works best. Patient reports she has an appt with [facility] early next week.   -PT referral   -Naproxen 500 mg BID x 30 days   -ice   -Ortho [facility] 1/3/23  -accupuncture this afternoon.   -attempted countersttrain technique with 20% reduction in pain.   - naproxen (NAPROSYN) 500 MG tablet  Dispense: 60 tablet; Refill: 0  - Physical Therapy Referral  - diclofenac (VOLTAREN) 1 % topical gel  Dispense: 100 g; Refill: 1    Ingrowing toenail with infection  Left great toe appears infected deep under the nail bed. Patient reports she drained pus from the toe last week. Will start antibiotics for 5 days and follow up for ingrown nail removal thereafter.   - sulfamethoxazole-trimethoprim (BACTRIM DS) 800-160 MG tablet  Dispense: 10 tablet; Refill: 0    Return in about 1 week (around 1/6/2023).    Sandeep Brown DO, PGY-2  [facility]    Today I precepted with Dr. Emanuel MD, who agrees with the assessment and plan.      Darshana Mckoy is a 22 year old, presenting for the following health issues:  Pain (Back pain pt still having a hard time walking. Muscle relaxers haven't helped)      HPI   Patient presents for follow up on left hip pain x 3 weeks. Seen earlier this week and imaging completed. Discussed preliminary results. Radiologist reading pending.     Patient reports she has spoken with her insurance about getting a referral to ortho and alternative treatment modalities including acupuncture, scheduled for later today.      She would like to try OMT as well.     Pain is 6/10 today.     Review of Systems   Constitutional, HEENT, cardiovascular, pulmonary, gi and gu systems are negative, except as otherwise noted.      Objective    /88 (BP Location: Left arm, Patient Position: Sitting, Cuff Size: Adult Regular)   Pulse 118   Temp 98.3  F (36.8  C) (Oral)   Resp 16   SpO2 99%   There is no height or weight on file to calculate BMI.  Physical Exam   GENERAL: healthy, alert and no distress  NECK: no adenopathy, no asymmetry, masses, or scars and thyroid normal to palpation  RESP: lungs clear to auscultation - no rales, rhonchi or wheezes  CV: regular rate and rhythm, normal S1 S2, no S3 or S4, no murmur, click or rub, no peripheral edema and peripheral pulses strong  ABDOMEN: soft, nontender, no hepatosplenomegaly, no masses and bowel sounds normal  MS:no gross deformity.  antalgic gait, left hip/buttock tenderness  EXT: left great toe erythema and edema, no appreciable pockets of pus

## 2023-01-05 ENCOUNTER — OFFICE VISIT (OUTPATIENT)
Dept: FAMILY MEDICINE | Facility: CLINIC | Age: 23
End: 2023-01-05
Payer: COMMERCIAL

## 2023-01-05 VITALS
TEMPERATURE: 98.2 F | SYSTOLIC BLOOD PRESSURE: 109 MMHG | BODY MASS INDEX: 23.34 KG/M2 | WEIGHT: 149 LBS | RESPIRATION RATE: 14 BRPM | OXYGEN SATURATION: 98 % | DIASTOLIC BLOOD PRESSURE: 77 MMHG | HEART RATE: 108 BPM

## 2023-01-05 DIAGNOSIS — L60.0 INGROWING TOENAIL WITH INFECTION: Primary | ICD-10-CM

## 2023-01-05 PROCEDURE — 11720 DEBRIDE NAIL 1-5: CPT | Mod: GC

## 2023-01-05 PROCEDURE — 99207 PR DROP WITH A PROCEDURE: CPT

## 2023-01-05 NOTE — PROGRESS NOTES
Toenail Debridement Procedure Note     Procedure Date: 1/5/23    Anesthesia:1% plain lidocaine, 5mL  Preparation: Betadine Scrub  Locations: Left lateral first toe    Indications:  Ingrown left toenail    HPI: Received Bactrim x5 days for infected ingrown left lateral toenail, starting 12/30. Completed course of abx. Erythema improved, but still present.     Description of Operation/Procedure:  Freed edge of ingrown left lateral toenail without removal of toenail.     The nature and purpose of the procedure, associated risks (bleeding, infection, pain, recovery time, recurrence) and that after healing, a second procedure or revision may be recommended in order to obtain the best cosmetic or functional result. Verbal and signed consent were obtained.     The patient was brought to the surgical suite and placed in the supine position with left knee flexed. The surgical site was sterilely prepped with betadine. The left first toe was infiltrated with a digital block and additional anesthetic was injected around the nail margin(s) and into the nail bed. 2.5ml of lidocaine were injected on each side. A tight rubber band was used at the proximal end of the big toe.     The distal edge of the nail was lifted up with hemostat until the lateral edge of the ingrown nail was freed. Area of infection near ingrown toenail debrided. Silver nitrate was used for hematstasis at the distal edge with good results.     The surgical site was dressed with a band-aid. The patient was given both verbal and written instructions on wound care. Follow up if symptoms persist or worsen. Advised on wide sole shoes, and cutting toenail straight across.       Complications: The patient tolerated the procedure well and no complications were noted    Estimated Blood Loss: minimal    Plan: Discharge instructions were provided. Recommend soaking digit in soapy water 15 min twice daily for 5-7 days

## 2023-01-05 NOTE — PATIENT INSTRUCTIONS
Soapy water soaks for 15 min twice a day for 5-7 days    Follow up if worsening signs of infection or significant bleeding or if symptoms persist

## 2023-01-05 NOTE — PROGRESS NOTES
Preceptor Attestation:    I discussed the patient with the resident and evaluated the patient in person. I was present for and supervised the entire procedure. I have verified the content of the note, which accurately reflects my assessment of the patient and the plan of care.   Supervising Physician:  Cleveland Dang MD.

## 2023-01-06 NOTE — PROGRESS NOTES
Preceptor Attestation:    I discussed the patient with the resident and evaluated the patient in person. I have verified the content of the note, which accurately reflects my assessment of the patient and the plan of care.   Supervising Physician:  Shashank Castellanos MD.

## 2023-01-16 ENCOUNTER — VIRTUAL VISIT (OUTPATIENT)
Dept: DERMATOLOGY | Facility: CLINIC | Age: 23
End: 2023-01-16
Payer: COMMERCIAL

## 2023-01-16 DIAGNOSIS — Z79.899 ON ISOTRETINOIN THERAPY: ICD-10-CM

## 2023-01-16 DIAGNOSIS — L70.0 ACNE VULGARIS: Primary | ICD-10-CM

## 2023-01-16 PROCEDURE — 99441 PR PHYSICIAN TELEPHONE EVALUATION 5-10 MIN: CPT | Mod: 93 | Performed by: DERMATOLOGY

## 2023-01-16 RX ORDER — ISOTRETINOIN 40 MG/1
80 CAPSULE ORAL DAILY
Qty: 60 CAPSULE | Refills: 0 | Status: SHIPPED | OUTPATIENT
Start: 2023-01-16 | End: 2023-02-17

## 2023-01-16 NOTE — PROGRESS NOTES
[facility] Dermatology Note  Encounter Date: Jan 16, 2023  Telephone ([phone] ). Location of teledermatologist: [facility]ERMAT[facility] [place]. Start time: 12:16. End time: 12:21.    Dermatology Problem List:  1. Acne vulgaris: isotretinoin 80 mg   - goal dose: 70 kg x 220 mg/kg = 78714 mg  - cumulative dose: 6000 mg   - contraception: Depo + condoms  - prior: tretinoin, topical antibiotics, spironolactone    ____________________________________________    Assessment & Plan:     1. Acne vulgaris: on isotretinoin 80 mg daily and tolerating well without significant side effects. Starting to see some early improvement in acne, though remains active.   - UPT in 1 month  - isotretinoin 80 mg daily    Procedures Performed:    None    Follow-up: 1 month    Staff:     René Desir MD, FAAD   of Dermatology  Department of Dermatology  [facility] [employer]    ____________________________________________    CC: Accutane (Follow-up/)    HPI:  Ms. Leelee Rousseau is a(n) 22 year old female who presents today as a return patient for acne vulgaris    Acne vulgaris - on isotretinoin 80 mg daily  - seeing reduction in acne on back - still getting active breakouts but improving  - some scarring on the face  - dryness manageable, no significant MSK symptoms    Patient is otherwise feeling well, without additional skin concerns.    Labs Reviewed:  1/16/23 UPT negative    Physical Exam:  Vitals: There were no vitals taken for this visit.  SKIN: Teledermatology photos were reviewed; image quality and interpretability: acceptable. Image date: 1/16/23.  - acneiform papules and scarring on the face and upper back  - No other lesions of concern on areas examined.     Medications:  Current Outpatient Medications   Medication     ISOtretinoin (ACCUTANE) 40 MG capsule     medroxyPROGESTERone (DEPO-SUBQ PROVERA) 104 MG/0.65ML SC injection      cyclobenzaprine (FLEXERIL) 5 MG tablet     diclofenac (VOLTAREN) 1 % topical gel     naproxen (NAPROSYN) 500 MG tablet     No current facility-administered medications for this visit.      Past Medical/Surgical History:   There is no problem list on file for this patient.    No past medical history on file.    CC Referred Self, MD  No address on file on close of this encounter.

## 2023-01-16 NOTE — LETTER
1/16/2023       RE: Leelee Rousseau  [address] [facility]   [address]  [place] [address]     Dear Colleague,    Thank you for referring your patient, Leelee Rousseau, to the [facility] DERMATOLOGY [facility]INIC [place] at [facility]. Please see a copy of my visit note below.    [facility] Dermatology Note  Encounter Date: Jan 16, 2023  Telephone ([phone] ). Location of teledermatologist: [facility] [place]. Start time: 12:16. End time: 12:21.    Dermatology Problem List:  1. Acne vulgaris: isotretinoin 80 mg   - goal dose: 70 kg x 220 mg/kg = 20568 mg  - cumulative dose: 6000 mg   - contraception: Depo + condoms  - prior: tretinoin, topical antibiotics, spironolactone    ____________________________________________    Assessment & Plan:     1. Acne vulgaris: on isotretinoin 80 mg daily and tolerating well without significant side effects. Starting to see some early improvement in acne, though remains active.   - UPT in 1 month  - isotretinoin 80 mg daily    Procedures Performed:    None    Follow-up: 1 month    Staff:     René Desir MD, FAAD   of Dermatology  Department of Dermatology  [facility] [employer]    ____________________________________________    CC: Accutane (Follow-up/)    HPI:  Ms. Leelee Rousseau is a(n) 22 year old female who presents today as a return patient for acne vulgaris    Acne vulgaris - on isotretinoin 80 mg daily  - seeing reduction in acne on back - still getting active breakouts but improving  - some scarring on the face  - dryness manageable, no significant MSK symptoms    Patient is otherwise feeling well, without additional skin concerns.    Labs Reviewed:  1/16/23 UPT negative    Physical Exam:  Vitals: There were no vitals taken for this visit.  SKIN: Teledermatology photos were reviewed; image quality and interpretability:  acceptable. Image date: 1/16/23.  - acneiform papules and scarring on the face and upper back  - No other lesions of concern on areas examined.     Medications:  Current Outpatient Medications   Medication     ISOtretinoin (ACCUTANE) 40 MG capsule     medroxyPROGESTERone (DEPO-SUBQ PROVERA) 104 MG/0.65ML SC injection     cyclobenzaprine (FLEXERIL) 5 MG tablet     diclofenac (VOLTAREN) 1 % topical gel     naproxen (NAPROSYN) 500 MG tablet     No current facility-administered medications for this visit.      Past Medical/Surgical History:   There is no problem list on file for this patient.    No past medical history on file.    CC Referred Self, MD  No address on file on close of this encounter.      Again, thank you for allowing me to participate in the care of your patient.      Sincerely,    René Desir MD

## 2023-01-16 NOTE — NURSING NOTE
Chief Complaint   Patient presents with     Accutane     Follow-up       Teledermatology Nurse Call Patients:     Are you in the [facility] at the time of the encounter? yes    Today's visit will be billed to you and your insurance.    A teledermatology visit is not as thorough as an in-person visit and the quality of the photograph sent may not be of the same quality as that taken by the dermatology clinic.

## 2023-01-16 NOTE — LETTER
Date:January 16, 2023      Provider requested that no letter be sent. Do not send.       [facility]

## 2023-02-01 ENCOUNTER — OFFICE VISIT (OUTPATIENT)
Dept: FAMILY MEDICINE | Facility: CLINIC | Age: 23
End: 2023-02-01
Payer: COMMERCIAL

## 2023-02-01 ENCOUNTER — TRANSFERRED RECORDS (OUTPATIENT)
Dept: HEALTH INFORMATION MANAGEMENT | Facility: CLINIC | Age: 23
End: 2023-02-01

## 2023-02-01 VITALS
HEART RATE: 95 BPM | TEMPERATURE: 98.2 F | RESPIRATION RATE: 20 BRPM | SYSTOLIC BLOOD PRESSURE: 133 MMHG | HEIGHT: 67 IN | WEIGHT: 147.4 LBS | BODY MASS INDEX: 23.13 KG/M2 | DIASTOLIC BLOOD PRESSURE: 80 MMHG | OXYGEN SATURATION: 99 %

## 2023-02-01 DIAGNOSIS — Z11.4 SCREENING FOR HIV (HUMAN IMMUNODEFICIENCY VIRUS): ICD-10-CM

## 2023-02-01 DIAGNOSIS — Z11.3 SCREENING FOR STDS (SEXUALLY TRANSMITTED DISEASES): ICD-10-CM

## 2023-02-01 DIAGNOSIS — Z12.4 CERVICAL CANCER SCREENING: ICD-10-CM

## 2023-02-01 DIAGNOSIS — L03.032 PARONYCHIA OF TOE, LEFT: Primary | ICD-10-CM

## 2023-02-01 DIAGNOSIS — Z11.59 NEED FOR HEPATITIS C SCREENING TEST: ICD-10-CM

## 2023-02-01 PROCEDURE — 99213 OFFICE O/P EST LOW 20 MIN: CPT | Performed by: FAMILY MEDICINE

## 2023-02-01 RX ORDER — CLOBETASOL PROPIONATE 0.5 MG/G
OINTMENT TOPICAL 2 TIMES DAILY
Qty: 30 G | Refills: 1 | Status: SHIPPED | OUTPATIENT
Start: 2023-02-01 | End: 2023-12-05

## 2023-02-01 RX ORDER — CEPHALEXIN 500 MG/1
500 CAPSULE ORAL 3 TIMES DAILY
Qty: 30 CAPSULE | Refills: 0 | Status: SHIPPED | OUTPATIENT
Start: 2023-02-01 | End: 2023-02-11

## 2023-02-01 NOTE — PROGRESS NOTES
"  Assessment & Plan   Continued left toe concerns, with development of granulation tissue and associated purulence.  Previously improved somewhat with Bactrim, and also had nail edge lifted.  -- Given that she works as a , and this is her third visit for this, I recommend that she see podiatry and consider matrixectomy, as this will dramatically reduce the chance of recurrence.  -- In the meanwhile, will treat any secondary infection with cephalexin and high-dose steroid (clobetasol) to forming granulation tissue.    Follow-up as needed after podiatry consultation.    Subjective   Leelee Rousseau is a 22 year old female who presents for follow-up of her toenail.    She was seen nearly 1 month ago (1/5/2023) with ingrown toenail of the left big toe, persisting despite 5 days of Bactrim before that.  At that visit, a procedure was done to free the edge of the lateral toenail without removal.    Today, she returns because it is unimproved and she is concerned for re-development of infection.  She is treating it regularly with neosporin and hydrogen peroxide.  She is on her feet, working as a .    Social: She reports that she has never smoked. She has never used smokeless tobacco.    Objective   Vitals: /80   Pulse 95   Temp 98.2  F (36.8  C) (Oral)   Resp 20   Ht 1.702 m (5' 7.01\")   Wt 66.9 kg (147 lb 6.4 oz)   SpO2 99%   BMI 23.08 kg/m    General: Pleasant. Young woman. No distress.  Skin: Left great toe with inflamed granulation tissue growing over the lateral edge of the nail and some surrounding purulence.  Psych: Appropriate grooming and hygiene. Speech normal rate. Thoughts are logical and well-organized. Appropriate mood and affect.    "

## 2023-02-02 NOTE — PATIENT INSTRUCTIONS
02/02/23   ORTHOPEDICS ADULT REFERRAL  [facility]  Phone: [phone]  Fax: [phone]    Demographics, referral, office note(s) and radiology reports faxed to [phone], they will contact pt to schedule an appt.    Jessica Burnham

## 2023-02-16 ENCOUNTER — TELEPHONE (OUTPATIENT)
Dept: DERMATOLOGY | Facility: CLINIC | Age: 23
End: 2023-02-16
Payer: COMMERCIAL

## 2023-02-16 NOTE — TELEPHONE ENCOUNTER
Left patient a voicemail to upload photos for Dermatology appointment on 2/17/23.     Shelby Kocher

## 2023-02-17 ENCOUNTER — VIRTUAL VISIT (OUTPATIENT)
Dept: DERMATOLOGY | Facility: CLINIC | Age: 23
End: 2023-02-17
Payer: COMMERCIAL

## 2023-02-17 DIAGNOSIS — Z79.899 ON ISOTRETINOIN THERAPY: ICD-10-CM

## 2023-02-17 DIAGNOSIS — L70.0 ACNE VULGARIS: Primary | ICD-10-CM

## 2023-02-17 PROCEDURE — 99207 PR NO BILLABLE SERVICE THIS VISIT: CPT | Mod: 93 | Performed by: DERMATOLOGY

## 2023-02-17 RX ORDER — ISOTRETINOIN 40 MG/1
80 CAPSULE ORAL DAILY
Qty: 60 CAPSULE | Refills: 0 | Status: SHIPPED | OUTPATIENT
Start: 2023-02-17 | End: 2023-03-17

## 2023-02-17 NOTE — PROGRESS NOTES
[facility] Dermatology Note  Encounter Date: Feb 17, 2023  Store-and-Forward and Telephone ([phone]). Location of teledermatologist: [facility] DERMATOLOGY CLINIC [place].  Start time: 7:30. End time: 7:34.    Dermatology Problem List:  1. Acne vulgaris: isotretinoin 80 mg   - goal dose: 70 kg x 220 mg/kg = 20646 mg  - cumulative dose: 8400 mg   - contraception: Depo + condoms  - prior: tretinoin, topical antibiotics, spironolactone    ____________________________________________    Assessment & Plan:     1. Acne vulgaris: on isotretinoin 80 mg daily and tolerating well. Seeing improvement but still having active acne. Estimated 3 months to reach goal dose, however we discussed clinical parameters for cessation of treatment and may need to extend based on clinical response.  - isotretinoin 80 mg daily  - UPT in 1 month    Procedures Performed:    None    Follow-up: 1 month    Staff:     René Desir MD, FAAD   of Dermatology  Department of Dermatology  [facility]    ____________________________________________    CC: Derm Problem (Accutane follow-up )    HPI:  Ms. Leelee Rousseau is a(n) 23 year old female who presents today as a return patient for acne vulgaris    Acne vulgaris - on isotretinoin 80 mg daily  - center of back improving  - still getting breakouts on shoulders  - still getting some on the peripheral cheeks  - seeing improvement but not yet at goal  - dryness     Patient is otherwise feeling well, without additional skin concerns.    Labs Reviewed:  2/16/23 UPT negative    Physical Exam:  Vitals: There were no vitals taken for this visit.  SKIN: Teledermatology photos were reviewed; image quality and interpretability: acceptable. Image date: 2/16/23.  - acneiform papules and scarring on the face and trunk  - No other lesions of concern on areas examined.     Medications:  Current Outpatient Medications    Medication     clobetasol (TEMOVATE) 0.05 % external ointment     ISOtretinoin (ACCUTANE) 40 MG capsule     medroxyPROGESTERone (DEPO-SUBQ PROVERA) 104 MG/0.65ML SC injection     No current facility-administered medications for this visit.      Past Medical/Surgical History:   There is no problem list on file for this patient.    No past medical history on file.    CC Referred Self, MD  No address on file on close of this encounter.

## 2023-02-17 NOTE — LETTER
Date:February 17, 2023      Provider requested that no letter be sent. Do not send.       [facility]

## 2023-02-17 NOTE — LETTER
2/17/2023       RE: Leelee Rousseau  [address] [facility]   [address]  [place] [address]     Dear Colleague,    Thank you for referring your patient, Leelee Rousseau, to the [facility] DERMATOLOGY [facility]IN[facility] [place] at [facility]. Please see a copy of my visit note below.    [facility] Dermatology Note  Encounter Date: Feb 17, 2023  Store-and-Forward and Telephone ([phone]). Location of teledermatologist: [facility]MATOLOGY [facility] [place].  Start time: 7:30. End time: 7:34.    Dermatology Problem List:  1. Acne vulgaris: isotretinoin 80 mg   - goal dose: 70 kg x 220 mg/kg = 58123 mg  - cumulative dose: 8400 mg   - contraception: Depo + condoms  - prior: tretinoin, topical antibiotics, spironolactone    ____________________________________________    Assessment & Plan:     1. Acne vulgaris: on isotretinoin 80 mg daily and tolerating well. Seeing improvement but still having active acne. Estimated 3 months to reach goal dose, however we discussed clinical parameters for cessation of treatment and may need to extend based on clinical response.  - isotretinoin 80 mg daily  - UPT in 1 month    Procedures Performed:    None    Follow-up: 1 month    Staff:     René Desir MD, FAAD   of Dermatology  Department of Dermatology  [facility]    ____________________________________________    CC: Derm Problem (Accutane follow-up )    HPI:  Ms. Leelee Rousseau is a(n) 23 year old female who presents today as a return patient for acne vulgaris    Acne vulgaris - on isotretinoin 80 mg daily  - center of back improving  - still getting breakouts on shoulders  - still getting some on the peripheral cheeks  - seeing improvement but not yet at goal  - dryness     Patient is otherwise feeling well, without additional skin concerns.    Labs Reviewed:  2/16/23 UPT negative    Physical  Exam:  Vitals: There were no vitals taken for this visit.  SKIN: Teledermatology photos were reviewed; image quality and interpretability: acceptable. Image date: 2/16/23.  - acneiform papules and scarring on the face and trunk  - No other lesions of concern on areas examined.     Medications:  Current Outpatient Medications   Medication     clobetasol (TEMOVATE) 0.05 % external ointment     ISOtretinoin (ACCUTANE) 40 MG capsule     medroxyPROGESTERone (DEPO-SUBQ PROVERA) 104 MG/0.65ML SC injection     No current facility-administered medications for this visit.      Past Medical/Surgical History:   There is no problem list on file for this patient.    No past medical history on file.    CC Xavier Scott MD  No address on file on close of this encounter.        Again, thank you for allowing me to participate in the care of your patient.      Sincerely,    René Desir MD

## 2023-02-17 NOTE — PROGRESS NOTES
[facility] Dermatology Note  Encounter Date: Feb 17, 2023  Store-and-Forward and Telephone ([phone]). Location of teledermatologist: [facility] DERMATOLOGY CLINIC [place].  Start time: ***. End time: ***.    Dermatology Problem List:  1. ***    ____________________________________________    Assessment & Plan:     # {Diagnosesderm:233185}.   {kkplans:091065}   - ***     # {Diagnosesderm:495513}.   {kkplans:167607}   - ***     Procedures Performed:    None    Follow-up: {kkfollowup:138232}    {kkstaffinvolved:058392}    ***  ____________________________________________    CC: Derm Problem (Accutane follow-up )    HPI:  Ms. Leelee Rousseau is a(n) 23 year old female who presents today {kknew/return:018948} for ***    Patient is otherwise feeling well, without additional skin concerns.    Labs Reviewed:  ***N/A    Physical Exam:  Vitals: There were no vitals taken for this visit.  SKIN: Teledermatology photos were reviewed; image quality and interpretability: ***acceptable. Image date: ***.  - ***  - No other lesions of concern on areas examined.     Medications:  Current Outpatient Medications   Medication     clobetasol (TEMOVATE) 0.05 % external ointment     ISOtretinoin (ACCUTANE) 40 MG capsule     medroxyPROGESTERone (DEPO-SUBQ PROVERA) 104 MG/0.65ML SC injection     No current facility-administered medications for this visit.      Past Medical/Surgical History:   There is no problem list on file for this patient.    No past medical history on file.    CC Referred Self, MD  No address on file on close of this encounter.

## 2023-02-17 NOTE — NURSING NOTE
Chief Complaint   Patient presents with     Derm Problem     Accutane follow-up      Teledermatology Nurse Call Patients:     Are you in the [facility] at the time of the encounter? yes    Today's visit will be billed to you and your insurance.    A teledermatology visit is not as thorough as an in-person visit and the quality of the photograph sent may not be of the same quality as that taken by the dermatology clinic.

## 2023-02-24 ENCOUNTER — OFFICE VISIT (OUTPATIENT)
Dept: PODIATRY | Facility: CLINIC | Age: 23
End: 2023-02-24
Attending: FAMILY MEDICINE
Payer: COMMERCIAL

## 2023-02-24 VITALS — WEIGHT: 147 LBS | OXYGEN SATURATION: 100 % | HEIGHT: 67 IN | BODY MASS INDEX: 23.07 KG/M2

## 2023-02-24 DIAGNOSIS — L03.032 PARONYCHIA OF TOE, LEFT: ICD-10-CM

## 2023-02-24 PROCEDURE — 99203 OFFICE O/P NEW LOW 30 MIN: CPT | Mod: 25 | Performed by: PODIATRIST

## 2023-02-24 PROCEDURE — 11750 EXCISION NAIL&NAIL MATRIX: CPT | Mod: TA | Performed by: PODIATRIST

## 2023-02-24 NOTE — PATIENT INSTRUCTIONS
"Aftercare instructions  -Remove the bandage tomorrow to begin soaking  -You may soak 2x daily in warm Epsom salts or warm soapy water for 5-10 minutes.  Alternatively, you may remove the band-aid and let clean shower water run over the toe.  -Cover w/ an antibiotic ointment and a band-aid  -If the skin retains too much moisture, stop the antibiotic ointment and use Alcohol or Betadine (Iodine) to clean the toe daily.  -All supplies are available over the counter, you do not need a prescription  -Redness and drainage (straw colored or bloody) are normal reactions to the chemical    -Continue soaking until the drainage stops  -Call the clinic if you experience:   -Redness extending beyond the 1st joint (\"knuckle\") of the toe   -Severe blistering   -Purulent (pus) drainage   "

## 2023-02-24 NOTE — PROGRESS NOTES
Assessment:      ICD-10-CM    1. Paronychia of toe, left  L03.032 Orthopedic  Referral     REMOVAL NAIL/NAIL BED, PARTIAL OR COMPLETE             Plan:  Orders Placed This Encounter   Procedures     REMOVAL NAIL/NAIL BED, PARTIAL OR COMPLETE       Discussed the etiology and treatment of the condition with the patient.  Discussed treatment options.  Recommend partial removal of nail plate with chemical matrixectomy.  Procedure:  Nail avulsion  PARQ session was held with the patient, verbal consent obtained.  Local anesthesia obtained to the digit using lidocaine plain.  Skin was prepped.  Offending nail border, lateral border of hallux L was removed in total.  Curettage of matrix performed.  Chemical matricectomy was performed using phenol.  Irrigation was carried out with saline.  Ointment and band-aid applied.    Patient tolerated the procedure well.    -Post-procedural instructions dispensed to patient.     Return:  No follow-ups on file.    Chetna De Leon DPM                Chief Complaint:     Patient presents with:  Left Great Toe - Ingrown Toenail     left ingrown toenail    HPI:  Leelee Rousseau is a 23 year old year old female who presents for evaluation of ingrown toenail.        Past Medical & Surgical History:  No past medical history on file.   No past surgical history on file.   No family history on file.     Social History:  ?  History   Smoking Status     Never   Smokeless Tobacco     Never     History   Drug Use Not on file     Social History    Substance and Sexual Activity      Alcohol use: Not on file      Allergies:  ?   Allergies   Allergen Reactions     Penicillins Anaphylaxis, Hives, Itching and Unknown        Medications:    Current Outpatient Medications   Medication     clobetasol (TEMOVATE) 0.05 % external ointment     ISOtretinoin (ACCUTANE) 40 MG capsule     medroxyPROGESTERone (DEPO-SUBQ PROVERA) 104 MG/0.65ML SC injection     No current facility-administered  "medications for this visit.       Physical Exam:  ?  Vitals:  Ht 1.702 m (5' 7\")   Wt 66.7 kg (147 lb)   SpO2 100%   BMI 23.02 kg/m     General:  WD/WN, in NAD.  A&O x3.  Dermatologic:    Onychocryptosis present lateral border(s) of hallux left.  Paronychia present.  Purulence absent.  Skin texture, turgor is normal.  Vascular:  Pulses palpable bilateral.  Digital capillary refill time normal bilateral.  Skin temperature is warm to affected digit(s).  Neurologic:    Gross sensation normal.  Gait and balance normal.  Musculoskeletal:  Maximal pain to palpation of affected nail border(s).  Gross deformity absent.            "

## 2023-02-24 NOTE — LETTER
February 24, 2023      Leelee Rousseau  [address]  [place] [address]        To Whom It May Concern:    Leelee Rousseau  was seen on 2/24/23 for evaluation of her left big toe. Please excuse her from work starting 2/24/23 - 2/26/23.     If you have any questions or concerns, please call the number listed above.         Sincerely,        Chetna De Leon DPM

## 2023-03-02 ENCOUNTER — TELEPHONE (OUTPATIENT)
Dept: DERMATOLOGY | Facility: CLINIC | Age: 23
End: 2023-03-02
Payer: COMMERCIAL

## 2023-03-16 ENCOUNTER — TELEPHONE (OUTPATIENT)
Dept: DERMATOLOGY | Facility: CLINIC | Age: 23
End: 2023-03-16
Payer: COMMERCIAL

## 2023-03-17 ENCOUNTER — VIRTUAL VISIT (OUTPATIENT)
Dept: DERMATOLOGY | Facility: CLINIC | Age: 23
End: 2023-03-17
Payer: COMMERCIAL

## 2023-03-17 DIAGNOSIS — L70.0 ACNE VULGARIS: Primary | ICD-10-CM

## 2023-03-17 DIAGNOSIS — Z79.899 ON ISOTRETINOIN THERAPY: ICD-10-CM

## 2023-03-17 PROCEDURE — 99441 PR PHYSICIAN TELEPHONE EVALUATION 5-10 MIN: CPT | Mod: 93 | Performed by: DERMATOLOGY

## 2023-03-17 RX ORDER — ISOTRETINOIN 40 MG/1
80 CAPSULE ORAL DAILY
Qty: 60 CAPSULE | Refills: 0 | Status: SHIPPED | OUTPATIENT
Start: 2023-03-17 | End: 2023-04-21

## 2023-03-17 NOTE — NURSING NOTE
Chief Complaint   Patient presents with     Follow Up     Accutane     Teledermatology Nurse Call Patients:     Are you in the [facility] at the time of the encounter? yes    Today's visit will be billed to you and your insurance.    A teledermatology visit is not as thorough as an in-person visit and the quality of the photograph sent may not be of the same quality as that taken by the dermatology clinic.    Shelby Kocher

## 2023-03-17 NOTE — PROGRESS NOTES
[facility] Dermatology Note  Encounter Date: Mar 17, 2023  Store-and-Forward and Telephone ([phone] ). Location of teledermatologist: [facility] DERMATOLOGY CLINIC [place].  Start time: 8:47. End time: 8:55.    Dermatology Problem List:  1. Acne vulgaris: isotretinoin 80 mg   - goal dose: 70 kg x 220 mg/kg = 71059 mg  - cumulative dose: 75833 mg   - contraception: Depo + condoms  - prior: tretinoin, topical antibiotics, spironolactone       ____________________________________________    Assessment & Plan:     1. Acne vulgaris: on isotretinoin 80 mg daily and tolerating well. Face is clearing nicely; back improving but still a bit active. We discussed goal of clearance x4-6 weeks prior to completion of course and will monitor this closely over the next couple of months. Patient has projected 2 months to reach goal dose but may need to extend course pending clinical response. We briefly discussed treatment of acne scarring but recommend deferring until completion of isotretinoin   - isotretinoin 80 mg daily  - UPT in 1 month    Procedures Performed:    None    Follow-up: 1 month    Staff:     René Desir MD, FAAD   of Dermatology  Department of Dermatology  [facility]    ____________________________________________    CC: Follow Up (Accutane)    HPI:  Ms. Leelee Rousseau is a(n) 23 year old female who presents today as a return patient for acne vulgaris    Acne vulgaris - on isotretinoin 80 mg daily  - face clearing nicely  - back improving but still a bit active  - seeing some discoloration/scarring in areas of old acne on face  - dryness manageable with moisturizers  - no MSK symptoms, no headaches, no mood changes    Patient is otherwise feeling well, without additional skin concerns.    Labs Reviewed:  3/17/23 UPT negative    Physical Exam:  Vitals: There were no vitals taken for this visit.  SKIN: Teledermatology photos  were reviewed; image quality and interpretability: acceptable. Image date: 3/17/23.  - acneiform papules and scarring on the face and back  - No other lesions of concern on areas examined.     Medications:  Current Outpatient Medications   Medication     clobetasol (TEMOVATE) 0.05 % external ointment     ISOtretinoin (ACCUTANE) 40 MG capsule     medroxyPROGESTERone (DEPO-SUBQ PROVERA) 104 MG/0.65ML SC injection     No current facility-administered medications for this visit.      Past Medical/Surgical History:   There is no problem list on file for this patient.    No past medical history on file.    CC Referred Self, MD  No address on file on close of this encounter.

## 2023-03-17 NOTE — LETTER
3/17/2023       RE: Leelee Rousseau  [address]  [place] [address]     Dear Colleague,    Thank you for referring your patient, Leelee Rousseau, to the [facility] DERMATOLOGY CLINIC [place] at [facility]. Please see a copy of my visit note below.    [facility] Dermatology Note  Encounter Date: Mar 17, 2023  Store-and-Forward and Telephone ([phone] ). Location of teledermatologist: [facility] DERMATOLOGY [facility].  Start time: 8:47. End time: 8:55.    Dermatology Problem List:  1. Acne vulgaris: isotretinoin 80 mg   - goal dose: 70 kg x 220 mg/kg = 02744 mg  - cumulative dose: 69026 mg   - contraception: Depo + condoms  - prior: tretinoin, topical antibiotics, spironolactone       ____________________________________________    Assessment & Plan:     1. Acne vulgaris: on isotretinoin 80 mg daily and tolerating well. Face is clearing nicely; back improving but still a bit active. We discussed goal of clearance x4-6 weeks prior to completion of course and will monitor this closely over the next couple of months. Patient has projected 2 months to reach goal dose but may need to extend course pending clinical response. We briefly discussed treatment of acne scarring but recommend deferring until completion of isotretinoin   - isotretinoin 80 mg daily  - UPT in 1 month    Procedures Performed:    None    Follow-up: 1 month    Staff:     René Desir MD, FAAD   of Dermatology  Department of Dermatology  [facility]    ____________________________________________    CC: Follow Up (Accutane)    HPI:  Ms. Leelee Rousseau is a(n) 23 year old female who presents today as a return patient for acne vulgaris    Acne vulgaris - on isotretinoin 80 mg daily  - face clearing nicely  - back improving but still a bit active  - seeing some discoloration/scarring in  areas of old acne on face  - dryness manageable with moisturizers  - no MSK symptoms, no headaches, no mood changes    Patient is otherwise feeling well, without additional skin concerns.    Labs Reviewed:  3/17/23 UPT negative    Physical Exam:  Vitals: There were no vitals taken for this visit.  SKIN: Teledermatology photos were reviewed; image quality and interpretability: acceptable. Image date: 3/17/23.  - acneiform papules and scarring on the face and back  - No other lesions of concern on areas examined.     Medications:  Current Outpatient Medications   Medication     clobetasol (TEMOVATE) 0.05 % external ointment     ISOtretinoin (ACCUTANE) 40 MG capsule     medroxyPROGESTERone (DEPO-SUBQ PROVERA) 104 MG/0.65ML SC injection     No current facility-administered medications for this visit.      Past Medical/Surgical History:   There is no problem list on file for this patient.    No past medical history on file.    CALDERON Scott MD  No address on file on close of this encounter.        Again, thank you for allowing me to participate in the care of your patient.      Sincerely,    René Desir MD

## 2023-03-17 NOTE — LETTER
Date:March 20, 2023      Provider requested that no letter be sent. Do not send.       [facility]

## 2023-04-21 ENCOUNTER — VIRTUAL VISIT (OUTPATIENT)
Dept: DERMATOLOGY | Facility: CLINIC | Age: 23
End: 2023-04-21
Payer: COMMERCIAL

## 2023-04-21 DIAGNOSIS — L70.0 ACNE VULGARIS: Primary | ICD-10-CM

## 2023-04-21 DIAGNOSIS — Z79.899 ON ISOTRETINOIN THERAPY: ICD-10-CM

## 2023-04-21 PROCEDURE — 99442 PR PHYSICIAN TELEPHONE EVALUATION 11-20 MIN: CPT | Mod: 93 | Performed by: DERMATOLOGY

## 2023-04-21 RX ORDER — ISOTRETINOIN 40 MG/1
80 CAPSULE ORAL DAILY
Qty: 60 CAPSULE | Refills: 0 | Status: SHIPPED | OUTPATIENT
Start: 2023-04-21 | End: 2023-06-02

## 2023-04-21 NOTE — PROGRESS NOTES
[facility] Dermatology Note  Encounter Date: 2023  Store-and-Forward and Telephone ([phone] ). Location of teledermatologist: [facility] D[facility]MATOLOGY CLINIC [place].  Start time: 8:00am. End time: 8:15.    Dermatology Problem List:  1. Acne vulgaris: isotretinoin 80 mg   - goal dose: 70 kg x 220 mg/kg = 09352 mg  - cumulative dose: 13,200 mg   - mg/kg dosin mg/kg  - contraception: Depo + condoms  - prior: tretinoin, topical antibiotics, spironolactone    ____________________________________________    Assessment & Plan:     1. Acne vulgaris: on isotretinoin 80 mg daily and tolerating well. Face is clearing nicely; back improving but still a bit active. We discussed goal of clearance x4-6 weeks prior to completion of course and will monitor this closely over the next couple of months. Patient has projected 2 months to reach goal dose but may need to extend course pending clinical response. We briefly discussed treatment of acne scarring but recommend deferring until completion of isotretinoin   - isotretinoin 80 mg daily  - UPT today and in 1 month      Procedures Performed:    None    Follow-up: 1 month(s) virtually (telephone with photos), or earlier for new or changing lesions    Staff and Resident:     Mariluz Arellano MD  Dermatology Resident, PGY-2    Staff Physician Comments:   I evaluated any available patient photographs with the resident and I edited the assessment and plan as documented in the note. I was present on the line and participated in the entire telephone call.    René Desir MD   of Dermatology  Department of Dermatology  [facility]      ____________________________________________    CC: Follow Up (Accutane )    HPI:  Ms. Leelee Rousseau is a(n) 23 year old female who presents today as a return patient for Accutane follow up.     -No new pimples on the face but not on the shoulders    -Tolerating medication well.   -No concerns today, was unable to obtain pregnancy test but will send int this am around 10am.     Patient is otherwise feeling well, without additional skin concerns.    Labs Reviewed:  UPT: pending       Physical Exam:  Vitals: There were no vitals taken for this visit.  SKIN: Teledermatology photos were reviewed; image quality and interpretability: acceptable. Image date: 4/19/23.  - Acneiform scarring on the back with few erythematous papules on the shoulders toed   - No other lesions of concern on areas examined.     Medications:  Current Outpatient Medications   Medication     clobetasol (TEMOVATE) 0.05 % external ointment     ISOtretinoin (ACCUTANE) 40 MG capsule     medroxyPROGESTERone (DEPO-SUBQ PROVERA) 104 MG/0.65ML SC injection     No current facility-administered medications for this visit.      Past Medical/Surgical History:   There is no problem list on file for this patient.    No past medical history on file.    CC Referred Self, MD  No address on file on close of this encounter.

## 2023-04-21 NOTE — LETTER
2023       RE: Leelee Rousseau  [address]  [place] [address]     Dear Colleague,    Thank you for referring your patient, Leelee Rousseau, to the [facility] DERMATOLOGY CLINIC [place] at [facility]. Please see a copy of my visit note below.    [facility] Dermatology Note  Encounter Date: 2023  Store-and-Forward and Telephone ([phone] ). Location of teledermatologist: [facility]ERMATOLOGY [facility] [place].  Start time: 8:00am. End time: 8:15.    Dermatology Problem List:  1. Acne vulgaris: isotretinoin 80 mg   - goal dose: 70 kg x 220 mg/kg = 76232 mg  - cumulative dose: 13,200 mg   - mg/kg dosin mg/kg  - contraception: Depo + condoms  - prior: tretinoin, topical antibiotics, spironolactone    ____________________________________________    Assessment & Plan:     1. Acne vulgaris: on isotretinoin 80 mg daily and tolerating well. Face is clearing nicely; back improving but still a bit active. We discussed goal of clearance x4-6 weeks prior to completion of course and will monitor this closely over the next couple of months. Patient has projected 2 months to reach goal dose but may need to extend course pending clinical response. We briefly discussed treatment of acne scarring but recommend deferring until completion of isotretinoin   - isotretinoin 80 mg daily  - UPT today and in 1 month      Procedures Performed:    None    Follow-up: 1 month(s) virtually (telephone with photos), or earlier for new or changing lesions    Staff and Resident:     Mariluz Arellano MD  Dermatology Resident, PGY-2    Staff Physician Comments:   I evaluated any available patient photographs with the resident and I edited the assessment and plan as documented in the note. I was present on the line and participated in the entire telephone call.    René Desir MD   of Dermatology  Department  of Dermatology  [facility]      ____________________________________________    CC: Follow Up (Accutane )    HPI:  Ms. Leelee Rousseau is a(n) 23 year old female who presents today as a return patient for Accutane follow up.     -No new pimples on the face but not on the shoulders   -Tolerating medication well.   -No concerns today, was unable to obtain pregnancy test but will send int this am around 10am.     Patient is otherwise feeling well, without additional skin concerns.    Labs Reviewed:  UPT: pending       Physical Exam:  Vitals: There were no vitals taken for this visit.  SKIN: Teledermatology photos were reviewed; image quality and interpretability: acceptable. Image date: 4/19/23.  - Acneiform scarring on the back with few erythematous papules on the shoulders toed   - No other lesions of concern on areas examined.     Medications:  Current Outpatient Medications   Medication    clobetasol (TEMOVATE) 0.05 % external ointment    ISOtretinoin (ACCUTANE) 40 MG capsule    medroxyPROGESTERone (DEPO-SUBQ PROVERA) 104 MG/0.65ML SC injection     No current facility-administered medications for this visit.      Past Medical/Surgical History:   There is no problem list on file for this patient.    No past medical history on file.    CC Referred Self, MD  No address on file on close of this encounter.

## 2023-04-21 NOTE — NURSING NOTE
Chief Complaint   Patient presents with     Follow Up     Accutane        Teledermatology Nurse Call Patients:     Are you in the [facility] at the time of the encounter? yes    Today's visit will be billed to you and your insurance.    A teledermatology visit is not as thorough as an in-person visit and the quality of the photograph sent may not be of the same quality as that taken by the dermatology clinic.    Shelby Kocher

## 2023-05-25 ENCOUNTER — TELEPHONE (OUTPATIENT)
Dept: DERMATOLOGY | Facility: CLINIC | Age: 23
End: 2023-05-25
Payer: COMMERCIAL

## 2023-05-25 NOTE — TELEPHONE ENCOUNTER
[facility] Call Center    Phone Message    May a detailed message be left on voicemail: yes     Reason for Call: Appointment Intake    Diagnosis and/or Symptoms: Patient Missed Phone visit, needs to reschedule a visit this week or next if possible.    Patient would like a calll to reschedule, she is scheduled on 06/19. She only has about a week's worth of her Accutane left.     Action Taken: Message routed to:  [facility]    Travel Screening: Not Applicable

## 2023-05-26 NOTE — TELEPHONE ENCOUNTER
Writer called patient and rescheduled for a telephone visit with Dr. Guillermo. Original provider not available, she is OK with seeing another provider. Patient has no further questions, she did request we keep the June 19th appointment for now.    Belgica REVELES RN

## 2023-06-01 NOTE — PROGRESS NOTES
[facility] Dermatology Note  Encounter Date: Jun 2, 2023  Store-and-Forward and Telephone ([phone] ). Location of teledermatologist: [facility] DERMATOLOGY CLINIC [place].  Start time: 8:49 am. End time: 9:03 AM    Dermatology Problem List:  1. Acne vulgaris: isotretinoin 80 mg   - goal dose: 70 kg x 220 mg/kg = 29086 mg  - cumulative dose: 15,600 mg   - contraception: Depo + condoms  - prior: tretinoin, topical antibiotics, spironolactone    ____________________________________________    Assessment & Plan:     # Acne vulgaris. Chronic, flare/not at goal/side effects of medication.   Still with few persistent areas on the upper back with intermittent flares despite 220 mg/kg dosing. We discussed extension of isotretinoin course for 1-3 months until achieve at least 4 weeks with lack of flares. Also discussed adjuvant treatment with oral spironolactone given seems to develop deeper nodulocystic lesions that flare cyclically. Patient has tried spironolactone in the past but was willing to repeat trial while on isotretinoin.   - Reviewed labs: home urine pregnancy test negative 6/2/23  - Continue isotretinoin 80 mg PO qdaily; anticipate 1-3 additional months  - Start spironolactone 100 mg PO qdaily    # Retinoid dermatitis, neck.  - Diligent use of emollients  - Can use triam 0.025% ointment BID PRN flares.     Procedures Performed:    None    Follow-up: 1 month(s) virtually (telephone with photos), or earlier for new or changing lesions    Staff only    Rolf Guillermo MD  Pronouns: he/him/his    Department of Dermatology  [facility]: Phone: [phone], Fax:[phone]  [facility]: Phone: [phone] Fax: [phone]    ____________________________________________    CC: Follow Up (Accutane)    HPI:  Ms. Leelee Rousseau is a(n) 23 year old female who  presents today as a return patient for Accutane follow up.     Last seen 1 month ago when continued on isotretinoin 80 mg PO qdaily.   Today, patient reports still with intermittent flares of deeper acne lesions on the upper back/shoulders.   She reports dry skin with isotretinoin, including more itchy, scaly rash on the neck.   Otherwise tolerating well.     Patient is otherwise feeling well, without additional skin concerns.    Labs Reviewed:  UPT: negative 6/2/23      Physical Exam:  Vitals: There were no vitals taken for this visit.  SKIN: Teledermatology photos were reviewed; image quality and interpretability: acceptable. Image date: 6/2/23  - Few acneiform papules on the upper back.   - No other lesions of concern on areas examined.                           Medications:  Current Outpatient Medications   Medication     ISOtretinoin (ACCUTANE) 40 MG capsule     medroxyPROGESTERone (DEPO-SUBQ PROVERA) 104 MG/0.65ML SC injection     clobetasol (TEMOVATE) 0.05 % external ointment     No current facility-administered medications for this visit.      Past Medical/Surgical History:   There is no problem list on file for this patient.    No past medical history on file.

## 2023-06-02 ENCOUNTER — VIRTUAL VISIT (OUTPATIENT)
Dept: DERMATOLOGY | Facility: CLINIC | Age: 23
End: 2023-06-02
Payer: COMMERCIAL

## 2023-06-02 DIAGNOSIS — L70.0 ACNE VULGARIS: Primary | ICD-10-CM

## 2023-06-02 DIAGNOSIS — L20.9 ATOPIC DERMATITIS, UNSPECIFIED TYPE: ICD-10-CM

## 2023-06-02 PROCEDURE — 99441 PR PHYSICIAN TELEPHONE EVALUATION 5-10 MIN: CPT | Mod: 93 | Performed by: DERMATOLOGY

## 2023-06-02 RX ORDER — SPIRONOLACTONE 100 MG/1
100 TABLET, FILM COATED ORAL DAILY
Qty: 30 TABLET | Refills: 2 | Status: SHIPPED | OUTPATIENT
Start: 2023-06-02 | End: 2023-06-19

## 2023-06-02 RX ORDER — TRIAMCINOLONE ACETONIDE 0.25 MG/G
OINTMENT TOPICAL
Qty: 80 G | Refills: 11 | Status: SHIPPED | OUTPATIENT
Start: 2023-06-02 | End: 2023-12-15

## 2023-06-02 RX ORDER — ISOTRETINOIN 40 MG/1
80 CAPSULE ORAL DAILY
Qty: 60 CAPSULE | Refills: 0 | Status: SHIPPED | OUTPATIENT
Start: 2023-06-02 | End: 2023-07-19

## 2023-06-02 NOTE — NURSING NOTE
Chief Complaint   Patient presents with     Follow Up     Accutane     Teledermatology Nurse Call Patients:     Are you in the [facility] at the time of the encounter? yes    Today's visit will be billed to you and your insurance.    A teledermatology visit is not as thorough as an in-person visit and the quality of the photograph sent may not be of the same quality as that taken by the dermatology clinic.    Shelby Kocher

## 2023-06-02 NOTE — LETTER
6/2/2023       RE: Leelee Rousseau  [address]  [place] [address]     Dear Colleague,    Thank you for referring your patient, Leelee Rousseau, to the [facility] DERMATOLOGY CLINIC [place] at [facility]. Please see a copy of my visit note below.    [facility] Dermatology Note  Encounter Date: Jun 2, 2023  Store-and-Forward and Telephone ([phone] ). Location of teledermatologist: [facility] DERMATOLOGY CLINIC [place].  Start time: 8:49 am. End time: 9:03 AM    Dermatology Problem List:  1. Acne vulgaris: isotretinoin 80 mg   - goal dose: 70 kg x 220 mg/kg = 73410 mg  - cumulative dose: 15,600 mg   - contraception: Depo + condoms  - prior: tretinoin, topical antibiotics, spironolactone    ____________________________________________    Assessment & Plan:     # Acne vulgaris. Chronic, flare/not at goal/side effects of medication.   Still with few persistent areas on the upper back with intermittent flares despite 220 mg/kg dosing. We discussed extension of isotretinoin course for 1-3 months until achieve at least 4 weeks with lack of flares. Also discussed adjuvant treatment with oral spironolactone given seems to develop deeper nodulocystic lesions that flare cyclically. Patient has tried spironolactone in the past but was willing to repeat trial while on isotretinoin.   - Reviewed labs: home urine pregnancy test negative 6/2/23  - Continue isotretinoin 80 mg PO qdaily; anticipate 1-3 additional months  - Start spironolactone 100 mg PO qdaily    # Retinoid dermatitis, neck.  - Diligent use of emollients  - Can use triam 0.025% ointment BID PRN flares.     Procedures Performed:    None    Follow-up: 1 month(s) virtually (telephone with photos), or earlier for new or changing lesions    Staff only    Rolf Guillermo MD  Pronouns: he/him/his    Department of Dermatology  [facility]  [facility]: Phone: [phone], Fax:[phone]  [facility]: Phone: [phone] Fax: [phone]    ____________________________________________    CC: Follow Up (Accutane)    HPI:  Ms. Leelee Rousseau is a(n) 23 year old female who presents today as a return patient for Accutane follow up.     Last seen 1 month ago when continued on isotretinoin 80 mg PO qdaily.   Today, patient reports still with intermittent flares of deeper acne lesions on the upper back/shoulders.   She reports dry skin with isotretinoin, including more itchy, scaly rash on the neck.   Otherwise tolerating well.     Patient is otherwise feeling well, without additional skin concerns.    Labs Reviewed:  UPT: negative 6/2/23      Physical Exam:  Vitals: There were no vitals taken for this visit.  SKIN: Teledermatology photos were reviewed; image quality and interpretability: acceptable. Image date: 6/2/23  - Few acneiform papules on the upper back.   - No other lesions of concern on areas examined.                           Medications:  Current Outpatient Medications   Medication    ISOtretinoin (ACCUTANE) 40 MG capsule    medroxyPROGESTERone (DEPO-SUBQ PROVERA) 104 MG/0.65ML SC injection    clobetasol (TEMOVATE) 0.05 % external ointment     No current facility-administered medications for this visit.      Past Medical/Surgical History:   There is no problem list on file for this patient.    No past medical history on file.

## 2023-06-19 ENCOUNTER — VIRTUAL VISIT (OUTPATIENT)
Dept: DERMATOLOGY | Facility: CLINIC | Age: 23
End: 2023-06-19
Payer: COMMERCIAL

## 2023-06-19 DIAGNOSIS — L70.0 ACNE VULGARIS: Primary | ICD-10-CM

## 2023-06-19 DIAGNOSIS — Z79.899 ON ISOTRETINOIN THERAPY: ICD-10-CM

## 2023-06-19 PROCEDURE — 99441 PR PHYSICIAN TELEPHONE EVALUATION 5-10 MIN: CPT | Mod: 95 | Performed by: DERMATOLOGY

## 2023-06-19 RX ORDER — SPIRONOLACTONE 100 MG/1
100 TABLET, FILM COATED ORAL DAILY
Qty: 90 TABLET | Refills: 3 | Status: SHIPPED | OUTPATIENT
Start: 2023-06-19 | End: 2023-12-05

## 2023-06-19 NOTE — LETTER
6/19/2023       RE: Leelee Rousseau  [address]  [place] [facility] [place] [address]     Dear Colleague,    Thank you for referring your patient, Leelee Rousseau, to the [facility] DERMATOLOGY CLINIC [place] at [facility]. Please see a copy of my visit note below.    Virtual Visit Details    [facility] Dermatology Note  Encounter Date: Jun 19, 2023  Store-and-Forward and Telephone ([phone] ). Location of teledermatologist: [facility] DERMATOLOGY [facility].  Start time: 11:26. End time: 11:34.    Dermatology Problem List:  1. Acne vulgaris: isotretinoin 80 mg   - goal dose: 70 kg x 220 mg/kg = 66759 mg  - cumulative dose: 32524 mg (2 weeks into prescription as of 6/19/23)   - contraception: Depo + condoms  - prior: tretinoin, topical antibiotics, spironolactone     ____________________________________________    Assessment & Plan:     1. Acne vulgaris: on isotretinoin and restarted spironolactone at last visit. Tolerating well but still getting a few new acne lesions on the back. Face and chest are clear. Tolerating well. Will continue another 1-2 months of isotretinoin.  - isotretinoin 80 mg daily  - due for UPT in 2 weeks  - spironolactone 100 mg at bedtime     Procedures Performed:    None    Follow-up: 1 month    Staff:     René Desir MD, FAAD   of Dermatology  Department of Dermatology  [facility]    ____________________________________________    CC: Follow Up    HPI:  Ms. Leelee Rousseau is a(n) 23 year old female who presents today as a return patient for acne vulgaris    Acne vulgaris - on isotretinoin 80 mg daily   - still getting a few new lesions on the back/shoulders  - definitely improved but still getting  - face is clear, chest is clear  - restarted spironolactone 100 mg last visit    Patient is otherwise feeling well, without additional skin  concerns.    Labs Reviewed:  6/2/23 UPT negative    Physical Exam:  Vitals: There were no vitals taken for this visit.  SKIN: Teledermatology photos were reviewed; image quality and interpretability: acceptable. Image date: 6/19/23.  - acneiform papules on the face, acneiform scarring on the cheeks  - No other lesions of concern on areas examined.     Medications:  Current Outpatient Medications   Medication    ISOtretinoin (ACCUTANE) 40 MG capsule    medroxyPROGESTERone (DEPO-SUBQ PROVERA) 104 MG/0.65ML SC injection    spironolactone (ALDACTONE) 100 MG tablet    triamcinolone (KENALOG) 0.025 % external ointment    clobetasol (TEMOVATE) 0.05 % external ointment     No current facility-administered medications for this visit.      Past Medical/Surgical History:   There is no problem list on file for this patient.    No past medical history on file.    CC Referred Self, MD  No address on file on close of this encounter.

## 2023-06-19 NOTE — NURSING NOTE
Is the patient currently in the state of [place]? YES    Visit mode:TELEPHONE    If the visit is dropped, the patient can be reconnected by: TELEPHONE VISIT: Phone number: [phone]    Will anyone else be joining the visit? NO      How would you like to obtain your AVS? MyChart    Are changes needed to the allergy or medication list? NO    Reason for visit: Follow Up

## 2023-06-19 NOTE — PROGRESS NOTES
Virtual Visit Details    [facility] Dermatology Note  Encounter Date: Jun 19, 2023  Store-and-Forward and Telephone ([phone] ). Location of teledermatologist: [facility] DERMATOLOGY CLINIC [place].  Start time: 11:26. End time: 11:34.    Dermatology Problem List:  1. Acne vulgaris: isotretinoin 80 mg   - goal dose: 70 kg x 220 mg/kg = 94883 mg  - cumulative dose: 03151 mg (2 weeks into prescription as of 6/19/23)   - contraception: Depo + condoms  - prior: tretinoin, topical antibiotics, spironolactone     ____________________________________________    Assessment & Plan:     1. Acne vulgaris: on isotretinoin and restarted spironolactone at last visit. Tolerating well but still getting a few new acne lesions on the back. Face and chest are clear. Tolerating well. Will continue another 1-2 months of isotretinoin.  - isotretinoin 80 mg daily  - due for UPT in 2 weeks  - spironolactone 100 mg at bedtime     Procedures Performed:    None    Follow-up: 1 month    Staff:     René Desir MD, FAAD   of Dermatology  Department of Dermatology  [facility]    ____________________________________________    CC: Follow Up    HPI:  Ms. Leelee Rousseau is a(n) 23 year old female who presents today as a return patient for acne vulgaris    Acne vulgaris - on isotretinoin 80 mg daily   - still getting a few new lesions on the back/shoulders  - definitely improved but still getting  - face is clear, chest is clear  - restarted spironolactone 100 mg last visit    Patient is otherwise feeling well, without additional skin concerns.    Labs Reviewed:  6/2/23 UPT negative    Physical Exam:  Vitals: There were no vitals taken for this visit.  SKIN: Teledermatology photos were reviewed; image quality and interpretability: acceptable. Image date: 6/19/23.  - acneiform papules on the face, acneiform scarring on the cheeks  - No other lesions of  concern on areas examined.     Medications:  Current Outpatient Medications   Medication     ISOtretinoin (ACCUTANE) 40 MG capsule     medroxyPROGESTERone (DEPO-SUBQ PROVERA) 104 MG/0.65ML SC injection     spironolactone (ALDACTONE) 100 MG tablet     triamcinolone (KENALOG) 0.025 % external ointment     clobetasol (TEMOVATE) 0.05 % external ointment     No current facility-administered medications for this visit.      Past Medical/Surgical History:   There is no problem list on file for this patient.    No past medical history on file.    CC Referred Self, MD  No address on file on close of this encounter.

## 2023-07-13 ENCOUNTER — TELEPHONE (OUTPATIENT)
Dept: DERMATOLOGY | Facility: CLINIC | Age: 23
End: 2023-07-13
Payer: COMMERCIAL

## 2023-07-13 NOTE — TELEPHONE ENCOUNTER
[facility] Call Center    Phone Message    May a detailed message be left on voicemail: yes     Reason for Call: Other: Pt called and tried to  her meds today but it looks like her medication has been sent back to the clinic.  Pt will need us to resend it back to her pharm so she can pick it up. Pt said that she saw Dr. Desir on 6/19 and he was not able to put in the refill because it was too soon for a refill. Pt is in need of a refill now. If you have any questions, please reach her through [employer] because she will be working tonight. Thanks     Action Taken: Message routed to:  [facility] DERM    Travel Screening: Not Applicable

## 2023-07-13 NOTE — TELEPHONE ENCOUNTER
[facility] Call Center    Phone Message    May a detailed message be left on voicemail: yes     Reason for Call: Other: Patient would like to get her iPledge set up - states she sent in her negative pregnancy test a couple of days ago. Please call back [phone] Thank you     Action Taken: Message routed to:  [facility] Derm    Travel Screening: Not Applicable

## 2023-07-13 NOTE — TELEPHONE ENCOUNTER
Patient was responded to in regards to her sending in her pregnancy test, she needs an appointment per below:      Pramod Allison CMA  to Leelee Rousseau    [place]      7/11/23  7:43 AM  Hi,     It looks like you are suppose to have monthly appointments with Dr. Desir. Does this Friday morning work for you?      Pramod RAI CMA     This [employer] message has not been read.        Writer called patient to let her know this and offer a Friday morning appointment. Her mailbox is full, and cannot give messages at this time.    Belgica REVELES RN

## 2023-07-13 NOTE — TELEPHONE ENCOUNTER
Patient needs appointment, she has been notified of this on MyCYouAppi, I tried to call her earlier mailbox not set up, will notify her a second time on MyCHopelat.    Belgica REVELES RN

## 2023-07-17 NOTE — TELEPHONE ENCOUNTER
Pt following up on the status of her iPledge. She says she sent birth control test pictures and had an appointment. Please call with an update about the Accutane.    no weight-bearing restrictions

## 2023-07-19 NOTE — TELEPHONE ENCOUNTER
[facility] Call Center    Phone Message    May a detailed message be left on voicemail: yes     Reason for Call: Other: Pt called regarding below. Pt has not heard back from anyone regarding her accutane. Pt said it has been almost a week. She would like to  her meds ASAP.     Pt would also like to schedule her next f/u with Dr. Desir for July. Pt was wondering if she needs to do another pregnancy test. Please call her back. Thanks       Action Taken: Message routed to:  [facility] DERM    Travel Screening: Not Applicable

## 2023-08-11 ENCOUNTER — VIRTUAL VISIT (OUTPATIENT)
Dept: DERMATOLOGY | Facility: CLINIC | Age: 23
End: 2023-08-11
Payer: COMMERCIAL

## 2023-08-11 ENCOUNTER — TELEPHONE (OUTPATIENT)
Dept: DERMATOLOGY | Facility: CLINIC | Age: 23
End: 2023-08-11

## 2023-08-11 DIAGNOSIS — L70.0 ACNE VULGARIS: Primary | ICD-10-CM

## 2023-08-11 DIAGNOSIS — L81.9 POST-INFLAMMATORY PIGMENTARY CHANGES: ICD-10-CM

## 2023-08-11 PROCEDURE — 99441 PR PHYSICIAN TELEPHONE EVALUATION 5-10 MIN: CPT | Mod: 95 | Performed by: STUDENT IN AN ORGANIZED HEALTH CARE EDUCATION/TRAINING PROGRAM

## 2023-08-11 NOTE — PROGRESS NOTES
[phone]  [facility] Dermatology Note  Encounter Date: Aug 11, 2023  {kkvisit3:070247}    Dermatology Problem List:  1. ***    ____________________________________________    Assessment & Plan:     # {Diagnosesderm:432730}.   {kkplans:316598}   - ***     # {Diagnosesderm:280897}.   {kkplans:464586}   - ***     Procedures Performed:    None    Follow-up: {kkfollowup:391884}    {kkstaffinvolved:306251}    ***  ____________________________________________    CC: Accutane (The patient reports lapse in treatment for one week due to trouble getting the prescription. The patient reports some flaring of acne when treatment lapsed. The patient reports improvement of acne since starting accutane again. )    HPI:  Ms. Leelee Rousseau is a(n) 23 year old female who presents today {kknew/return:229317} for ***    Patient is otherwise feeling well, without additional skin concerns.    Labs Reviewed:  ***N/A    Physical Exam:  Vitals: There were no vitals taken for this visit.  SKIN: Teledermatology photos were reviewed; image quality and interpretability: ***acceptable. Image date: ***.  - ***  - No other lesions of concern on areas examined.     Medications:  Current Outpatient Medications   Medication    ISOtretinoin (ACCUTANE) 40 MG capsule    medroxyPROGESTERone (DEPO-SUBQ PROVERA) 104 MG/0.65ML SC injection    spironolactone (ALDACTONE) 100 MG tablet    triamcinolone (KENALOG) 0.025 % external ointment    clobetasol (TEMOVATE) 0.05 % external ointment     No current facility-administered medications for this visit.      Past Medical/Surgical History:   There is no problem list on file for this patient.    History reviewed. No pertinent past medical history.    CC Referred Self, MD  No address on file on close of this encounter.

## 2023-08-11 NOTE — PROGRESS NOTES
[facility] Dermatology Note  Encounter Date: Aug 11, 2023  Store-and-Forward and Telephone ([phone] ). Location of teledermatologist: [facility] [place].  Start time: 2:10. End time: 2:15.    [facility] Dermatology Note    Encounter Date: Aug 11, 2023    Dermatology Problem List:  #Acne vulgaris: isotretinoin 80 mg   - goal dose: 70 kg x 220 mg/kg = 16105 mg  - cumulative dose: 18,000 (at the end of 07/22/30 fill)  - contraception: Depo + condoms  - prior: tretinoin, topical antibiotics, spironolactone    Major PMHx  -   ______________________________________    Impression/Plan:  Leelee was seen today for accutane.    Diagnoses and all orders for this visit:    Acne vulgaris  Post-inflammatory pigmentary changes  -Tolerating isotretinoin 80 mg daily well, no negative pregnancy test yet  - We will refill when she sends in the negative pregnancy test      Follow-up in 1 mo .       Staff Involved:  Staff Only    Donovan Rasmussen MD   of Dermatology  Department of Dermatology  [employer] [facility] [employer]      CC:   Chief Complaint   Patient presents with    Accutane     The patient reports lapse in treatment for one week due to trouble getting the prescription. The patient reports some flaring of acne when treatment lapsed. The patient reports improvement of acne since starting accutane again.        History of Present Illness:  Ms. Leleee Rousseau is a 23 year old female who presents as a return patient.    Back and face has been ok.  Have her prescription for a week and so she did notice a 1 or 2 spots on her back during that week.  Has since gotten it filled and has not had any more flares.  Over well is tolerating the medicine well.  Did have a pregnancy test on August 1, but unfortunately is outside of the 1 week window.  Will send us a photo of a negative pregnancy test either later this evening or tomorrow  at which point we can can from her I pledge and refill her 80 mg    Labs:      Physical exam:  Vitals: There were no vitals taken for this visit.  GEN: well developed, well-nourished, in no acute distress, in a pleasant mood.     SKIN: Harden phototype 1  - Telemedicine photographs reviewed (Date of images: 08/11/23. Image quality and interpretability: acceptable. Location of teledermatologist: [facility] [place]. Start time: 2:`10. End time: 2:15)  - pigment alteration at sites of previous inflammation  - No other lesions of concern on areas examined.     Past Medical History:   History reviewed. No pertinent past medical history.  History reviewed. No pertinent surgical history.    Social History:   reports that she has never smoked. She has never used smokeless tobacco.    Family History:  History reviewed. No pertinent family history.    Medications:  Current Outpatient Medications   Medication Sig Dispense Refill    ISOtretinoin (ACCUTANE) 40 MG capsule Take 2 capsules (80 mg) by mouth daily 60 capsule 0    medroxyPROGESTERone (DEPO-SUBQ PROVERA) 104 MG/0.65ML SC injection       spironolactone (ALDACTONE) 100 MG tablet Take 1 tablet (100 mg) by mouth daily 90 tablet 3    triamcinolone (KENALOG) 0.025 % external ointment Apply twice daily as needed for rash on the neck. 80 g 11    clobetasol (TEMOVATE) 0.05 % external ointment Apply topically 2 times daily 30 g 1     Allergies   Allergen Reactions    Penicillins Anaphylaxis, Hives, Itching and Unknown

## 2023-08-11 NOTE — NURSING NOTE
[facility] FINAL     DISCHARGE PLAN NAME OF ACCEPTING FACILITY/AGENCY: [facility] [place]   PHONE OF ACCEPTING FACILITY/AGENCY: [phone]   PATIENT FAMILY AWARE OF DISCHARGE PLANS: Yes     PAN FACILITY   Was PAN facility offered?: Yes   Was PAN facility chosen by patient/family?: Yes   If outside facility, Why?:     NURSING HOME:   New Placement:Yes   Resumption: No penitentiary: No     TRANSPORTATION NOTE   Transportation mode: Ambulance   Name of transportation service: [facility]    Phone number:    Self pay costs explained to:  Cost:    Transportation scheduled for (date/time): 8/5/2021     Transportation confirmed with: RAKESH     Transportation mode: Ambulance is on WILL CALL for (date) 8/5/2021       OUTPATIENT DIALYSIS CHAIR NOTE    Placement: Resumption  Facility: [facility]  Schedule/Time: MWF/ 315pm  Additional Comments:          Dermatology Rooming Note    Leelee Rousseau's goals for this visit include:   Chief Complaint   Patient presents with    Accutane     The patient reports lapse in treatment for one week due to trouble getting the prescription. The patient reports some flaring of acne when treatment lapsed. The patient reports improvement of acne since starting accutane again.      Mishel Pack LPN

## 2023-08-11 NOTE — LETTER
8/11/2023       RE: Leelee Rousseau  [address]  [place] [address]     Dear Colleague,    Thank you for referring your patient, Leelee Rousseau, to the [facility]MATOLOG[facility] [place] at [facility]. Please see a copy of my visit note below.     [facility] Dermatology Note  Encounter Date: Aug 11, 2023  Store-and-Forward and Telephone ([phone] ). Location of teledermatologist: [facility].  Start time: 2:10. End time: 2:15.    [facility] Dermatology Note    Encounter Date: Aug 11, 2023    Dermatology Problem List:  #Acne vulgaris: isotretinoin 80 mg   - goal dose: 70 kg x 220 mg/kg = 06890 mg  - cumulative dose: 18,000 (at the end of 07/22/30 fill)  - contraception: Depo + condoms  - prior: tretinoin, topical antibiotics, spironolactone    Major PMHx  -   ______________________________________    Impression/Plan:  Leelee was seen today for accutane.    Diagnoses and all orders for this visit:    Acne vulgaris  Post-inflammatory pigmentary changes  -Tolerating isotretinoin 80 mg daily well, no negative pregnancy test yet  - We will refill when she sends in the negative pregnancy test      Follow-up in 1 mo .       Staff Involved:  Staff Only    Donovan Rasmussen MD   of Dermatology  Department of Dermatology  [facility] [employer]      CC:   Chief Complaint   Patient presents with    Accutane     The patient reports lapse in treatment for one week due to trouble getting the prescription. The patient reports some flaring of acne when treatment lapsed. The patient reports improvement of acne since starting accutane again.        History of Present Illness:  Ms. Leelee Rousseau is a 23 year old female who presents as a return patient.    Back and face has been ok.  Have her prescription for a week and so she did notice a 1 or  2 spots on her back during that week.  Has since gotten it filled and has not had any more flares.  Over well is tolerating the medicine well.  Did have a pregnancy test on August 1, but unfortunately is outside of the 1 week window.  Will send us a photo of a negative pregnancy test either later this evening or tomorrow at which point we can can from her I pledge and refill her 80 mg    Labs:      Physical exam:  Vitals: There were no vitals taken for this visit.  GEN: well developed, well-nourished, in no acute distress, in a pleasant mood.     SKIN: Harden phototype 1  - Telemedicine photographs reviewed (Date of images: 08/11/23. Image quality and interpretability: acceptable. Location of teledermatologist: [facility] [place]. Start time: 2:`10. End time: 2:15)  - pigment alteration at sites of previous inflammation  - No other lesions of concern on areas examined.     Past Medical History:   History reviewed. No pertinent past medical history.  History reviewed. No pertinent surgical history.    Social History:   reports that she has never smoked. She has never used smokeless tobacco.    Family History:  History reviewed. No pertinent family history.    Medications:  Current Outpatient Medications   Medication Sig Dispense Refill    ISOtretinoin (ACCUTANE) 40 MG capsule Take 2 capsules (80 mg) by mouth daily 60 capsule 0    medroxyPROGESTERone (DEPO-SUBQ PROVERA) 104 MG/0.65ML SC injection       spironolactone (ALDACTONE) 100 MG tablet Take 1 tablet (100 mg) by mouth daily 90 tablet 3    triamcinolone (KENALOG) 0.025 % external ointment Apply twice daily as needed for rash on the neck. 80 g 11    clobetasol (TEMOVATE) 0.05 % external ointment Apply topically 2 times daily 30 g 1     Allergies   Allergen Reactions    Penicillins Anaphylaxis, Hives, Itching and Unknown

## 2023-08-15 DIAGNOSIS — L70.0 ACNE VULGARIS: ICD-10-CM

## 2023-08-15 RX ORDER — ISOTRETINOIN 40 MG/1
80 CAPSULE ORAL DAILY
Qty: 60 CAPSULE | Refills: 0 | Status: SHIPPED | OUTPATIENT
Start: 2023-08-15 | End: 2023-12-15

## 2023-08-16 ENCOUNTER — TELEPHONE (OUTPATIENT)
Dept: DERMATOLOGY | Facility: CLINIC | Age: 23
End: 2023-08-16
Payer: COMMERCIAL

## 2023-08-16 NOTE — TELEPHONE ENCOUNTER
Called patient and left a TANVIR requesting patient to call back the clinic call back number ([phone]).

## 2023-10-21 ENCOUNTER — HEALTH MAINTENANCE LETTER (OUTPATIENT)
Age: 23
End: 2023-10-21

## 2023-11-08 ENCOUNTER — TRANSFERRED RECORDS (OUTPATIENT)
Dept: MULTI SPECIALTY CLINIC | Facility: CLINIC | Age: 23
End: 2023-11-08

## 2023-11-08 LAB
C TRACH DNA SPEC QL PROBE+SIG AMP: NEGATIVE
N GONORRHOEA DNA SPEC QL PROBE+SIG AMP: NEGATIVE
SPECIMEN DESCRIP: NORMAL
SPECIMEN DESCRIPTION: NORMAL

## 2023-11-28 ENCOUNTER — TELEPHONE (OUTPATIENT)
Dept: DERMATOLOGY | Facility: CLINIC | Age: 23
End: 2023-11-28
Payer: COMMERCIAL

## 2023-11-28 NOTE — TELEPHONE ENCOUNTER
[facility] Call Center    Phone Message    May a detailed message be left on voicemail: yes     Reason for Call: Appointment Intake    Referring Provider Name: Dr. Rasmussen  Diagnosis and/or Symptoms: Pt states she needs a post Accutane Follow-up visit and would like it to be virtual.     No templates available. Please call Pt back to schedule. Thank you.     Action Taken: Message routed to:  [facility] Derm    Travel Screening: Not Applicable

## 2023-12-05 ENCOUNTER — VIRTUAL VISIT (OUTPATIENT)
Dept: DERMATOLOGY | Facility: CLINIC | Age: 23
End: 2023-12-05
Payer: COMMERCIAL

## 2023-12-05 DIAGNOSIS — L81.9 POST-INFLAMMATORY PIGMENTARY CHANGES: ICD-10-CM

## 2023-12-05 DIAGNOSIS — L70.8 OTHER ACNE: Primary | ICD-10-CM

## 2023-12-05 DIAGNOSIS — L70.0 ACNE VULGARIS: ICD-10-CM

## 2023-12-05 PROCEDURE — 99441 PR PHYSICIAN TELEPHONE EVALUATION 5-10 MIN: CPT | Mod: 95 | Performed by: STUDENT IN AN ORGANIZED HEALTH CARE EDUCATION/TRAINING PROGRAM

## 2023-12-05 RX ORDER — SPIRONOLACTONE 100 MG/1
100 TABLET, FILM COATED ORAL DAILY
Qty: 90 TABLET | Refills: 6 | Status: SHIPPED | OUTPATIENT
Start: 2023-12-05

## 2023-12-05 RX ORDER — TRETINOIN 0.25 MG/G
CREAM TOPICAL
Qty: 45 G | Refills: 3 | Status: SHIPPED | OUTPATIENT
Start: 2023-12-05

## 2023-12-05 NOTE — PROGRESS NOTES
Dermatology Rooming Note    Leelee Rousseau's goals for this visit include:   Chief Complaint   Patient presents with    Derm Problem     Post accutane followup. Pt has questions about what to do/use now to help control acne. Currently only taking spirnolactone.     Jamir Seaman, EMT-B

## 2023-12-05 NOTE — LETTER
12/5/2023       RE: Leelee Rousseau  [address]  [place] [address]     Dear Colleague,    Thank you for referring your patient, Leelee Rousseau, to the [facility] [place] at [facility]. Please see a copy of my visit note below.    Dermatology Rooming Note    Leelee Rousseau's goals for this visit include:   Chief Complaint   Patient presents with    Derm Problem     Post accutane followup. Pt has questions about what to do/use now to help control acne. Currently only taking spirnolactone.     Jamir Seaman, EMT-B        [facility] Dermatology Note  Encounter Date: Dec 5, 2023  Telephone ([phone]). Location of teledermatologist: [facility] DERMATOLOG[facility]. Start time: 11:50. End time: 11:56.  [facility] Dermatology Note    Encounter Date: Dec 5, 2023    Dermatology Problem List:  #Acne vulgaris: isotretinoin 80 mg   - goal dose: 70 kg x 220 mg/kg = 73999 mg  - cumulative dose: 18,000 (at the end of 07/22/30 fill)  - contraception: Depo + condoms  - prior: tretinoin, topical antibiotics, spironolactone    Major PMHx  -   ______________________________________    Impression/Plan:  Leelee was seen today for derm problem.    Diagnoses and all orders for this visit:    Other acne  -     tretinoin (RETIN-A) 0.025 % external cream; Use every night  -     spironolactone (ALDACTONE) 100 MG tablet; Take 1 tablet (100 mg) by mouth daily  -Status post isotretinoin therapy with no flares  - Transition to topical tretinoin can continue spironolactone 100 mg daily per patient preference  - Consider discontinuing at 6-month follow-up  - negative pregnancy test    Post-inflammatory pigmentary changes  - benign    Other orders  -     Adult Dermatology Clinic Follow-Up Order (Blank); Future        Follow-up in 1 mo .       Staff Involved:  Staff Only    Donovan Rasmussen MD  Assistant  Professor of Dermatology  Department of Dermatology  [facility] [employer]      CC:   Chief Complaint   Patient presents with    Derm Problem     Post accutane followup. Pt has questions about what to do/use now to help control acne. Currently only taking spirnolactone.       History of Present Illness:  Ms. Leelee Rousseau is a 23 year old female who presents as a return patient.    S/p isotretinoin. Has questions to prevent flares s/p isotretinoin. Prev used tretinoin.  Continue spironolactone    Labs:      Physical exam:  Vitals: There were no vitals taken for this visit.  GEN: well developed, well-nourished, in no acute distress, in a pleasant mood.     SKIN: Harden phototype 1  - Telemedicine photographs reviewed (Date of images: . Image quality and interpretability: acceptable. Location of teledermatologist: [facility] [place]. Start time: 11:50. End time: 11:56)  - pigment alteration at sites of previous inflammation  - No other lesions of concern on areas examined.     Past Medical History:   History reviewed. No pertinent past medical history.  History reviewed. No pertinent surgical history.    Social History:   reports that she has never smoked. She has never used smokeless tobacco.    Family History:  History reviewed. No pertinent family history.    Medications:  Current Outpatient Medications   Medication Sig Dispense Refill    spironolactone (ALDACTONE) 100 MG tablet Take 1 tablet (100 mg) by mouth daily 90 tablet 6    tretinoin (RETIN-A) 0.025 % external cream Use every night 45 g 3    ISOtretinoin (ACCUTANE) 40 MG capsule Take 2 capsules (80 mg) by mouth daily (Patient not taking: Reported on 12/5/2023) 60 capsule 0    medroxyPROGESTERone (DEPO-SUBQ PROVERA) 104 MG/0.65ML SC injection  (Patient not taking: Reported on 12/5/2023)      triamcinolone (KENALOG) 0.025 % external ointment Apply twice daily as needed for rash on  the neck. (Patient not taking: Reported on 12/5/2023) 80 g 11     Allergies   Allergen Reactions    Penicillins Anaphylaxis, Hives, Itching and Unknown

## 2023-12-05 NOTE — PROGRESS NOTES
[facility] Dermatology Note  Encounter Date: Dec 5, 2023  Telephone ([phone]). Location of teledermatologist: [facility] [place]. Start time: 11:50. End time: 11:56.  [facility] Dermatology Note    Encounter Date: Dec 5, 2023    Dermatology Problem List:  #Acne vulgaris: isotretinoin 80 mg   - goal dose: 70 kg x 220 mg/kg = 00161 mg  - cumulative dose: 18,000 (at the end of 07/22/30 fill)  - contraception: Depo + condoms  - prior: tretinoin, topical antibiotics, spironolactone    Major PMHx  -   ______________________________________    Impression/Plan:  Leelee was seen today for derm problem.    Diagnoses and all orders for this visit:    Other acne  -     tretinoin (RETIN-A) 0.025 % external cream; Use every night  -     spironolactone (ALDACTONE) 100 MG tablet; Take 1 tablet (100 mg) by mouth daily  -Status post isotretinoin therapy with no flares  - Transition to topical tretinoin can continue spironolactone 100 mg daily per patient preference  - Consider discontinuing at 6-month follow-up  - negative pregnancy test    Post-inflammatory pigmentary changes  - benign    Other orders  -     Adult Dermatology Clinic Follow-Up Order (Blank); Future        Follow-up in 1 mo .       Staff Involved:  Staff Only    Donovan Rasmussen MD   of Dermatology  Department of Dermatology  [facility] [employer]      CC:   Chief Complaint   Patient presents with    Derm Problem     Post accutane followup. Pt has questions about what to do/use now to help control acne. Currently only taking spirnolactone.       History of Present Illness:  Ms. Leelee Rousseau is a 23 year old female who presents as a return patient.    S/p isotretinoin. Has questions to prevent flares s/p isotretinoin. Prev used tretinoin.  Continue spironolactone    Labs:      Physical exam:  Vitals: There were no vitals taken for this visit.  GEN:  well developed, well-nourished, in no acute distress, in a pleasant mood.     SKIN: Harden phototype 1  - Telemedicine photographs reviewed (Date of images: . Image quality and interpretability: acceptable. Location of teledermatologist: [facility], [facility] [place]. Start time: 11:50. End time: 11:56)  - pigment alteration at sites of previous inflammation  - No other lesions of concern on areas examined.     Past Medical History:   History reviewed. No pertinent past medical history.  History reviewed. No pertinent surgical history.    Social History:   reports that she has never smoked. She has never used smokeless tobacco.    Family History:  History reviewed. No pertinent family history.    Medications:  Current Outpatient Medications   Medication Sig Dispense Refill    spironolactone (ALDACTONE) 100 MG tablet Take 1 tablet (100 mg) by mouth daily 90 tablet 6    tretinoin (RETIN-A) 0.025 % external cream Use every night 45 g 3    ISOtretinoin (ACCUTANE) 40 MG capsule Take 2 capsules (80 mg) by mouth daily (Patient not taking: Reported on 12/5/2023) 60 capsule 0    medroxyPROGESTERone (DEPO-SUBQ PROVERA) 104 MG/0.65ML SC injection  (Patient not taking: Reported on 12/5/2023)      triamcinolone (KENALOG) 0.025 % external ointment Apply twice daily as needed for rash on the neck. (Patient not taking: Reported on 12/5/2023) 80 g 11     Allergies   Allergen Reactions    Penicillins Anaphylaxis, Hives, Itching and Unknown

## 2023-12-07 ENCOUNTER — TELEPHONE (OUTPATIENT)
Dept: DERMATOLOGY | Facility: CLINIC | Age: 23
End: 2023-12-07
Payer: COMMERCIAL

## 2023-12-07 NOTE — TELEPHONE ENCOUNTER
Left Voicemail (1st Attempt) and Sent Mychart (1st Attempt) for the patient to call back and schedule the following:    Appointment type: Return  Provider: Dr. Rasmussen   Return date: June 2024  Specialty phone number: [phone]

## 2023-12-13 ENCOUNTER — TELEPHONE (OUTPATIENT)
Dept: DERMATOLOGY | Facility: CLINIC | Age: 23
End: 2023-12-13
Payer: COMMERCIAL

## 2023-12-13 NOTE — TELEPHONE ENCOUNTER
Lvm sent my chart msg for patient to scheduled the following:    Appointment type: Return  Provider: Dr. Rasmussen  Return date: 06-05-24  Specialty phone number: [phone]

## 2023-12-15 ENCOUNTER — OFFICE VISIT (OUTPATIENT)
Dept: FAMILY MEDICINE | Facility: CLINIC | Age: 23
End: 2023-12-15
Payer: COMMERCIAL

## 2023-12-15 VITALS
SYSTOLIC BLOOD PRESSURE: 130 MMHG | HEART RATE: 117 BPM | OXYGEN SATURATION: 99 % | TEMPERATURE: 98.7 F | DIASTOLIC BLOOD PRESSURE: 90 MMHG

## 2023-12-15 DIAGNOSIS — J06.9 UPPER RESPIRATORY TRACT INFECTION, UNSPECIFIED TYPE: Primary | ICD-10-CM

## 2023-12-15 LAB
FLUAV RNA SPEC QL NAA+PROBE: NEGATIVE
FLUBV RNA RESP QL NAA+PROBE: NEGATIVE
RSV RNA SPEC NAA+PROBE: NEGATIVE
SARS-COV-2 RNA RESP QL NAA+PROBE: NEGATIVE

## 2023-12-15 PROCEDURE — 87637 SARSCOV2&INF A&B&RSV AMP PRB: CPT | Performed by: PHYSICIAN ASSISTANT

## 2023-12-15 PROCEDURE — 99213 OFFICE O/P EST LOW 20 MIN: CPT | Performed by: PHYSICIAN ASSISTANT

## 2023-12-15 ASSESSMENT — PAIN SCALES - GENERAL: PAINLEVEL: SEVERE PAIN (7)

## 2023-12-15 NOTE — LETTER
December 15, 2023      Leelee Rousseau  [address]  [place] [address]        To Whom It May Concern:    Leelee Rousseau  was seen on 12/15/2023. Patient has an acute illness that requires her to be absent from work. She can return to work once she is fever-free for 24 hours and symptoms are improving. Symptoms may last up to 10 days, so patient may be absent through 12/23/2023 but can return earlier if the above conditions are met.        Sincerely,        Lilliam Campos PA-C

## 2023-12-15 NOTE — PROGRESS NOTES
Assessment & Plan       ICD-10-CM    1. Upper respiratory tract infection, unspecified type  J06.9 Symptomatic Influenza A/B, RSV, & SARS-CoV2 PCR (COVID-19) Nose        Swab for COVID/RSV/Influenza pending, sx consistent with viral URI. Recommend supportive cares. Mucinex prn congestion, Tylenol/ibuprofen prn fever or body aches. Get plenty of rest and fluids. Off work until 24 hours fever-free and symptoms are improving. Consider Tamiflu pending respiratory swab results. No high risk condition that would indicate benefit from Paxlovid.    MEGAN [facility] PRATIBHA Mckoy is a 23 year old, presenting for the following health issues:  URI        12/15/2023    10:46 AM   Additional Questions   Roomed by Alphonso   Accompanied by N/A       History of Present Illness       Reason for visit:  Unkown sickness  Symptom onset:  1-3 days ago  Symptom intensity:  Severe  Symptom progression:  Staying the same  Had these symptoms before:  No  What makes it worse:  No  What makes it better:  No    She eats 2-3 servings of fruits and vegetables daily.She consumes 0 sweetened beverage(s) daily.She exercises with enough effort to increase her heart rate 20 to 29 minutes per day.  She exercises with enough effort to increase her heart rate 4 days per week.   She is taking medications regularly.       Pre-Provider Visit Orders - Rapid Strep  Does the patient have shortness of breath/trouble breathing, an earache, drooling/too much saliva, or any difficulty opening her mouth/moving neck?  No  Does the patient have a sore throat and either history of fever >100.4 in the previous 24 hours without a cough or recent exposure to a known case of strep throat? Yes     Acute Illness  Acute illness concerns  Onset/Duration: 2-3 days  Symptoms:  Fever: YES  Chills/Sweats: YES  Headache (location?): No  Sinus Pressure: YES  Conjunctivitis:  No  Ear Pain: no  Rhinorrhea: YES  Congestion:  YES  Sore Throat: YES  Cough: YES-non-productive  Wheeze: No  Decreased Appetite: YES  Nausea: YES  Vomiting: No  Diarrhea: No  Dysuria/Freq.: No  Dysuria or Hematuria: No  Fatigue/Achiness: YES  Sick/Strep Exposure: No - works as a , high exposure to general public  Therapies tried and outcome: Dayquil/Nyquil - little relief    - Patient has not received the current seasonal influenza or COVID vaccines.    Review of Systems   Constitutional, HEENT, cardiovascular, pulmonary, GI, , musculoskeletal, neuro, skin, endocrine and psych systems are negative, except as otherwise noted.      Objective    BP (!) 130/90   Pulse 117   Temp 98.7  F (37.1  C) (Tympanic)   SpO2 99%   There is no height or weight on file to calculate BMI.  Physical Exam   GENERAL:  WDWN, no acute distress  PSYCH: pleasant, cooperative  EYES: no discharge, no injection  HENT:  Normocephalic. Moist mucus membranes. TMs pearly gray w/o effusion. Oropharynx pink w/o tonsillar hypertrophy or exudate, uvula midline, clear PND.  NECK:  Supple, symmetric, shotty SD bilat  LUNGS:  Clear to auscultation bilaterally without rhonchi, rales, or wheeze. Chest rise symmetric and no tenderness to palpation. Infrequent dry cough  HEART:  Regular rate & rhythm. No murmur, gallop, or rub.  EXTREMITIES:  No gross deformities, moves all 4 limbs spontaneously  SKIN:  Warm and dry, no rash or suspicious lesions    NEUROLOGIC: alert, sensation grossly intact.

## 2023-12-15 NOTE — Clinical Note
Please abstract the following data from this visit with this patient into the appropriate field in Epic:  Tests that can be patient reported without a hard copy:  {Quality Abstract List (Optional):538087}  Other Tests found in the patient's chart through Chart Review/Care Everywhere:  Chlamydia testing done on this date: 11/8/2023  Note to Abstraction: If this section is blank, no results were found via Chart Review/Care Everywhere.

## 2024-12-08 ENCOUNTER — HEALTH MAINTENANCE LETTER (OUTPATIENT)
Age: 24
End: 2024-12-08

## 2025-01-21 DIAGNOSIS — L70.8 OTHER ACNE: ICD-10-CM

## 2025-01-28 RX ORDER — TRETINOIN 0.25 MG/G
CREAM TOPICAL
Qty: 45 G | Refills: 3 | OUTPATIENT
Start: 2025-01-28

## 2025-01-28 NOTE — TELEPHONE ENCOUNTER
Last Clinic Visit: 12/5/2023 [facility] [place]    tretinoin (RETIN-A) 0.025 % external cream: failed Dermatology medication protocol  - refused refill to pharmacy >12 months from last visit (process #1/Derm protocol).  - message sent to Dermatology scheduling

## 2025-03-11 ENCOUNTER — TELEPHONE (OUTPATIENT)
Dept: DERMATOLOGY | Facility: CLINIC | Age: 25
End: 2025-03-11
Payer: COMMERCIAL

## 2025-03-11 NOTE — TELEPHONE ENCOUNTER
3/11 Spoke to pt and attempted to schedule a yearly follow up lexi/ Karlee  Pt stated that she needs to check with her insurance and will call when she's ready